# Patient Record
Sex: FEMALE | Race: WHITE | NOT HISPANIC OR LATINO | Employment: OTHER | URBAN - METROPOLITAN AREA
[De-identification: names, ages, dates, MRNs, and addresses within clinical notes are randomized per-mention and may not be internally consistent; named-entity substitution may affect disease eponyms.]

---

## 2017-10-18 ENCOUNTER — TRANSFERRED RECORDS (OUTPATIENT)
Dept: HEALTH INFORMATION MANAGEMENT | Facility: CLINIC | Age: 62
End: 2017-10-18

## 2017-10-20 ENCOUNTER — TRANSFERRED RECORDS (OUTPATIENT)
Dept: HEALTH INFORMATION MANAGEMENT | Facility: CLINIC | Age: 62
End: 2017-10-20

## 2017-10-26 ENCOUNTER — TRANSFERRED RECORDS (OUTPATIENT)
Dept: HEALTH INFORMATION MANAGEMENT | Facility: CLINIC | Age: 62
End: 2017-10-26

## 2018-04-03 ENCOUNTER — TRANSFERRED RECORDS (OUTPATIENT)
Dept: HEALTH INFORMATION MANAGEMENT | Facility: CLINIC | Age: 63
End: 2018-04-03

## 2018-05-29 DIAGNOSIS — J84.9 ILD (INTERSTITIAL LUNG DISEASE) (H): Primary | ICD-10-CM

## 2018-07-08 PROCEDURE — 00000346 ZZHCL STATISTIC REVIEW OUTSIDE SLIDES TC 88321: Performed by: INTERNAL MEDICINE

## 2018-07-09 ASSESSMENT — ENCOUNTER SYMPTOMS
DYSPNEA ON EXERTION: 0
SHORTNESS OF BREATH: 0
COUGH DISTURBING SLEEP: 0
SNORES LOUDLY: 0
SPUTUM PRODUCTION: 0
POSTURAL DYSPNEA: 0
WHEEZING: 0
COUGH: 1
HEMOPTYSIS: 0

## 2018-07-10 LAB — COPATH REPORT: NORMAL

## 2018-07-14 ENCOUNTER — HEALTH MAINTENANCE LETTER (OUTPATIENT)
Age: 63
End: 2018-07-14

## 2018-07-18 ENCOUNTER — OFFICE VISIT (OUTPATIENT)
Dept: PULMONOLOGY | Facility: CLINIC | Age: 63
End: 2018-07-18
Attending: INTERNAL MEDICINE
Payer: COMMERCIAL

## 2018-07-18 ENCOUNTER — RADIANT APPOINTMENT (OUTPATIENT)
Dept: CT IMAGING | Facility: CLINIC | Age: 63
End: 2018-07-18
Attending: INTERNAL MEDICINE
Payer: COMMERCIAL

## 2018-07-18 VITALS
WEIGHT: 190 LBS | HEART RATE: 84 BPM | HEIGHT: 69 IN | DIASTOLIC BLOOD PRESSURE: 91 MMHG | OXYGEN SATURATION: 97 % | RESPIRATION RATE: 17 BRPM | BODY MASS INDEX: 28.14 KG/M2 | SYSTOLIC BLOOD PRESSURE: 135 MMHG

## 2018-07-18 DIAGNOSIS — J84.9 ILD (INTERSTITIAL LUNG DISEASE) (H): ICD-10-CM

## 2018-07-18 DIAGNOSIS — J84.9 ILD (INTERSTITIAL LUNG DISEASE) (H): Primary | ICD-10-CM

## 2018-07-18 LAB
6 MIN WALK (FT): 1520 FT
6 MIN WALK (M): 463 M
ALBUMIN SERPL-MCNC: 4 G/DL (ref 3.4–5)
ALP SERPL-CCNC: 91 U/L (ref 40–150)
ALT SERPL W P-5'-P-CCNC: 42 U/L (ref 0–50)
ANION GAP SERPL CALCULATED.3IONS-SCNC: 7 MMOL/L (ref 3–14)
AST SERPL W P-5'-P-CCNC: 23 U/L (ref 0–45)
BASOPHILS # BLD AUTO: 0.1 10E9/L (ref 0–0.2)
BASOPHILS NFR BLD AUTO: 1 %
BILIRUB DIRECT SERPL-MCNC: 0.1 MG/DL (ref 0–0.2)
BILIRUB SERPL-MCNC: 0.5 MG/DL (ref 0.2–1.3)
BUN SERPL-MCNC: 13 MG/DL (ref 7–30)
CALCIUM SERPL-MCNC: 9.3 MG/DL (ref 8.5–10.1)
CHLORIDE SERPL-SCNC: 104 MMOL/L (ref 94–109)
CK SERPL-CCNC: 47 U/L (ref 30–225)
CO2 SERPL-SCNC: 26 MMOL/L (ref 20–32)
CREAT SERPL-MCNC: 0.71 MG/DL (ref 0.52–1.04)
CRP SERPL-MCNC: 15.8 MG/L (ref 0–8)
DIFFERENTIAL METHOD BLD: ABNORMAL
EOSINOPHIL # BLD AUTO: 0.2 10E9/L (ref 0–0.7)
EOSINOPHIL NFR BLD AUTO: 3.5 %
ERYTHROCYTE [DISTWIDTH] IN BLOOD BY AUTOMATED COUNT: 12.5 % (ref 10–15)
ERYTHROCYTE [SEDIMENTATION RATE] IN BLOOD BY WESTERGREN METHOD: 9 MM/H (ref 0–30)
GFR SERPL CREATININE-BSD FRML MDRD: 84 ML/MIN/1.7M2
GLUCOSE SERPL-MCNC: 90 MG/DL (ref 70–99)
HCT VFR BLD AUTO: 47.9 % (ref 35–47)
HGB BLD-MCNC: 15.3 G/DL (ref 11.7–15.7)
IMM GRANULOCYTES # BLD: 0 10E9/L (ref 0–0.4)
IMM GRANULOCYTES NFR BLD: 0.6 %
LYMPHOCYTES # BLD AUTO: 1.7 10E9/L (ref 0.8–5.3)
LYMPHOCYTES NFR BLD AUTO: 24.7 %
MCH RBC QN AUTO: 29.8 PG (ref 26.5–33)
MCHC RBC AUTO-ENTMCNC: 31.9 G/DL (ref 31.5–36.5)
MCV RBC AUTO: 93 FL (ref 78–100)
MONOCYTES # BLD AUTO: 0.5 10E9/L (ref 0–1.3)
MONOCYTES NFR BLD AUTO: 7.3 %
NEUTROPHILS # BLD AUTO: 4.3 10E9/L (ref 1.6–8.3)
NEUTROPHILS NFR BLD AUTO: 62.9 %
NRBC # BLD AUTO: 0 10*3/UL
NRBC BLD AUTO-RTO: 0 /100
PLATELET # BLD AUTO: 255 10E9/L (ref 150–450)
POTASSIUM SERPL-SCNC: 4.3 MMOL/L (ref 3.4–5.3)
PROT SERPL-MCNC: 8.2 G/DL (ref 6.8–8.8)
RBC # BLD AUTO: 5.13 10E12/L (ref 3.8–5.2)
SODIUM SERPL-SCNC: 138 MMOL/L (ref 133–144)
WBC # BLD AUTO: 6.8 10E9/L (ref 4–11)

## 2018-07-18 PROCEDURE — 86255 FLUORESCENT ANTIBODY SCREEN: CPT | Performed by: INTERNAL MEDICINE

## 2018-07-18 PROCEDURE — 82085 ASSAY OF ALDOLASE: CPT | Performed by: INTERNAL MEDICINE

## 2018-07-18 PROCEDURE — 86235 NUCLEAR ANTIGEN ANTIBODY: CPT | Performed by: INTERNAL MEDICINE

## 2018-07-18 PROCEDURE — 86606 ASPERGILLUS ANTIBODY: CPT | Performed by: INTERNAL MEDICINE

## 2018-07-18 PROCEDURE — 36415 COLL VENOUS BLD VENIPUNCTURE: CPT | Performed by: INTERNAL MEDICINE

## 2018-07-18 PROCEDURE — G0463 HOSPITAL OUTPT CLINIC VISIT: HCPCS | Mod: ZF

## 2018-07-18 PROCEDURE — 82784 ASSAY IGA/IGD/IGG/IGM EACH: CPT | Performed by: INTERNAL MEDICINE

## 2018-07-18 PROCEDURE — 80048 BASIC METABOLIC PNL TOTAL CA: CPT | Performed by: INTERNAL MEDICINE

## 2018-07-18 PROCEDURE — 86038 ANTINUCLEAR ANTIBODIES: CPT | Performed by: INTERNAL MEDICINE

## 2018-07-18 PROCEDURE — 82787 IGG 1 2 3 OR 4 EACH: CPT | Performed by: INTERNAL MEDICINE

## 2018-07-18 PROCEDURE — 86331 IMMUNODIFFUSION OUCHTERLONY: CPT | Performed by: INTERNAL MEDICINE

## 2018-07-18 PROCEDURE — 86140 C-REACTIVE PROTEIN: CPT | Performed by: INTERNAL MEDICINE

## 2018-07-18 PROCEDURE — 80076 HEPATIC FUNCTION PANEL: CPT | Performed by: INTERNAL MEDICINE

## 2018-07-18 PROCEDURE — 86200 CCP ANTIBODY: CPT | Performed by: INTERNAL MEDICINE

## 2018-07-18 PROCEDURE — 82550 ASSAY OF CK (CPK): CPT | Performed by: INTERNAL MEDICINE

## 2018-07-18 PROCEDURE — 86431 RHEUMATOID FACTOR QUANT: CPT | Performed by: INTERNAL MEDICINE

## 2018-07-18 PROCEDURE — 85025 COMPLETE CBC W/AUTO DIFF WBC: CPT | Performed by: INTERNAL MEDICINE

## 2018-07-18 PROCEDURE — 85652 RBC SED RATE AUTOMATED: CPT | Performed by: INTERNAL MEDICINE

## 2018-07-18 RX ORDER — ESCITALOPRAM OXALATE 10 MG/1
10 TABLET ORAL
COMMUNITY
Start: 2018-04-03

## 2018-07-18 ASSESSMENT — PAIN SCALES - GENERAL: PAINLEVEL: NO PAIN (0)

## 2018-07-18 NOTE — MR AVS SNAPSHOT
After Visit Summary   7/18/2018    Windy Antonio    MRN: [id number]           Patient Information     Date Of Birth          1955        Visit Information        Provider Department      7/18/2018 9:30 AM Aaron Snow MD [facility]        Today's Diagnoses     ILD (interstitial lung disease) (H)    -  1       Follow-ups after your visit        Follow-up notes from your care team     Return in about 1 month (around 8/18/2018).      Your next 10 appointments already scheduled     Jul 18, 2018 10:45 AM CDT   Lab with  LAB   [facility] ([facility])    [address]  1st Floor  [place] [id number] [phone]            Aug 29, 2018  8:30 AM CDT   (Arrive by 8:15 AM)   Return Interstitial Lung with Aaron Snow MD   [facility] ([facility])    [address]  [address]  [place] [address] [phone]              Future tests that were ordered for you today     Open Future Orders        Priority Expected Expires Ordered    Rheumatoid factor Routine  10/16/2018 7/18/2018    SSA Ro JASPER Antibody IgG Routine  10/16/2018 7/18/2018    SSB La JASPER Antibody IgG Routine  10/16/2018 7/18/2018    Aldolase Routine  10/16/2018 7/18/2018    Hypersensitivity pneumonitis Routine  10/16/2018 7/18/2018    Hypersens Pneumonitis - Farmer's Lung II Routine  10/16/2018 7/18/2018    ANCA IgG by IFA with Reflex to Titer Routine  10/16/2018 7/18/2018    IgG Subclasses Routine  10/16/2018 7/18/2018    Cyclic Citrullinated Peptide Antibody IgG Routine  10/16/2018 7/18/2018    CBC with platelets differential Routine  8/17/2018 7/18/2018    Basic metabolic panel Routine  8/17/2018 7/18/2018    Hepatic panel Routine  8/17/2018 7/18/2018    Anti Nuclear Conchita IgG by IFA with Reflex Routine  10/16/2018 7/18/2018    Erythrocyte sedimentation rate auto Routine  10/16/2018  "7/18/2018    CRP inflammation Routine  10/16/2018 7/18/2018    CK total Routine  10/16/2018 7/18/2018    Marika 1 Antibody IgG Routine  10/16/2018 7/18/2018    Scleroderma Antibody Scl70 JASPER IgG Routine  10/16/2018 7/18/2018            Who to contact     If you have questions or need follow up information about today's clinic visit or your schedule please contact [facility] directly at [phone].  Normal or non-critical lab and imaging results will be communicated to you by [employer], letter or phone within 4 business days after the clinic has received the results. If you do not hear from us within 7 days, please contact the clinic through [employer] or phone. If you have a critical or abnormal lab result, we will notify you by phone as soon as possible.  Submit refill requests through 360T or call your pharmacy and they will forward the refill request to us. Please allow 3 business days for your refill to be completed.          Additional Information About Your Visit        360T Information     360T gives you secure access to your electronic health record. If you see a primary care provider, you can also send messages to your care team and make appointments. If you have questions, please call your primary care clinic.  If you do not have a primary care provider, please call [phone] and they will assist you.        Care EveryWhere ID     This is your Care EveryWhere ID. This could be used by other organizations to access your [place] medical records  [id number]        Your Vitals Were     Pulse Respirations Height Pulse Oximetry BMI (Body Mass Index)       84 17 1.74 m (5' 8.5\") 97% 28.47 kg/m2        Blood Pressure from Last 3 Encounters:   07/18/18 (!) 135/91    Weight from Last 3 Encounters:   07/18/18 86.2 kg (190 lb)               Primary Care Provider Office Phone # Fax #    Argentina Valdovinos [phone]       [address]  [place] [address]        Equal Access to Services     [facility] : Hadii aad ku hadevayonathan Vinson, wajimmietariq warren, momofrances moody. So [facility] [phone].    ATENCIÓN: Si habla español, tiene a houser disposición servicios gratuitos de asistencia lingüística. Llame al [phone].    We comply with applicable federal civil rights laws and [place] laws. We do not discriminate on the basis of race, color, national origin, age, disability, sex, sexual orientation, or gender identity.            Thank you!     Thank you for choosing [facility]  for your care. Our goal is always to provide you with excellent care. Hearing back from our patients is one way we can continue to improve our services. Please take a few minutes to complete the written survey that you may receive in the mail after your visit with us. Thank you!             Your Updated Medication List - Protect others around you: Learn how to safely use, store and throw away your medicines at www.disposemymeds.org.          This list is accurate as of 7/18/18 10:17 AM.  Always use your most recent med list.                   Brand Name Dispense Instructions for use Diagnosis    escitalopram 10 MG tablet    LEXAPRO     Take 10 mg by mouth    ILD (interstitial lung disease) (H)

## 2018-07-18 NOTE — PROGRESS NOTES
HISTORY OF PRESENT ILLNESS:  Ms. Antonio is a 63-year-old female with interstitial lung disease that is coming for a second opinion.  Her history of present illness she describes a history of having a chronic cough.  She has had a deviated septum and sinus congestion related to that and sort of chronic cough for many, many years.  In 2009, she was diagnosed with breast cancer.  In 2010 she underwent a lumpectomy and underwent chemotherapy with doxorubicin and Cytoxan for 4 months after which she received radiation therapy to the left breast for 6 weeks.  She denies respiratory difficulties in around the time of treatment with her breast cancer.  This past fall she developed a chest cold with increased cough that was treated with an antibiotic and was slow to resolve, which prompted a chest x-ray which showed some abnormalities.  After the course of antibiotics a repeat chest x-ray did not show resolution, so she end up having a chest CT scan which showed evidence of interstitial lung disease.  She then underwent a VATS biopsy in the fall of 2017, which was read as being consistent with UIP and she was given a diagnosis of IPF in 10/2017.  She was initiated on OFEV therapy, received a total of 3 weeks with it, and had significant side effects including rash, diarrhea, nausea, vomiting and increased liver function tests.  She was pulled off the OFEV at that time and so far since then has been on no other treatment.  In terms of her breathing she is an active person, she does exercises and activities of daily living, and she does not feel restricted by her breathing and does not notice dyspnea on exertion.  In terms of her past pulmonary history she is a nonsmoker and she has no history of past lung problems other than the chronic cough that she is aware of.      PAST MEDICAL HISTORY:   1.  Interstitial lung disease diagnosed in 2017 by VATS biopsy.   2.  Breast cancer diagnosed in 12/2009, underwent treatment with  chemotherapy and radiation therapy in .   3.  Status post oophorectomy in  for nonmalignant problem.   4.  History of parathyroid nodule, status post removal.   5.  Status post right wrist fracture from a fall.   6.  History of anxiety.      MEDICATIONS:  The only medication the patient is on currently is Lexapro.      FAMILY HISTORY:  Mother  of lung cancer, was a heavy smoker.  Father  of congestive heart failure.  Also had a brother  from lung cancer who also was a heavy smoker.  There is not a family history of connective tissue diseases that the patient is aware of and there is not a family history of pulmonary fibrosis that the patient is aware of.      SOCIAL HISTORY:  Again, she is a nonsmoker.  She owns adult  centers.  She denies exposure to asbestos.  No hot tub.  They do have a cabin.  There is no significant exposure to molds that the patient is aware of.  There are no particular hobbies that she has that would result in any exposures to do other things.      REVIEW OF SYSTEMS:     HEENT:  Again the deviated septum with nasal congestion, chronic cough.   CARDIAC:  She denies palpitations, any arrhythmias, no syncope, no chest pain.   GASTROINTESTINAL:  She denies GI problems.  She specifically denies reflux or heartburn.   GENITOURINARY:  Negative.   MUSCULOSKELETAL:  She has a history of the right wrist fracture and discomfort in that, but otherwise no arthritic problems that she is aware of.     The rest of her comprehensive review of systems is negative.      PHYSICAL EXAMINATION:   VITAL SIGNS:  Her blood pressure is 135/91, pulse is 84, respiratory rate 17.  O2 sats are 97% on room air.  Weight is 190 pounds.   EYES:  She is anicteric.   ENT:  Mouth and throat are without lesions.   NECK:  Supple.  No masses are appreciated, no thyromegaly or adenopathy is appreciated.   CHEST:  She has subtle crackles in the bases of the lungs, but otherwise sounds largely clear.    HEART:  Regular rate and rhythm, normal S1 and S2, no murmurs appreciated.   ABDOMEN:  Nontender, no hepatosplenomegaly.   EXTREMITIES:  Without clubbing, cyanosis or edema.  Again, I did not appreciate clubbing on examination.   MUSCULOSKELETAL:  Other than the right wrist deformity, no significant arthritic changes are noted.   SKIN:  No rashes noted.      PULMONARY FUNCTION TESTS:  The patient had PFTs done today.  She had a 6-minute walk where her walk distance was within normal limits.  Her O2 sats started out at 97%, the lowest it got was 91% during the walk.  Her saturations decreased but no desaturation and the walk test was done without oxygen.  Spirometry FEV1 is 2.41 or 84% of predicted, FVC was 2.9 or 79% of predicted, and total lung capacity was normal at 4.74 or 81% of predicted.  Diffusion capacity was mildly reduced at 64% of predicted.  There may be mild restriction and a mild diffusion defect.      IMAGING:  Chest CT scan was reviewed by me and showed peripheral interstitial opacities.  There was not a real great apical basilar gradient and I did not see honeycombing change.  The findings would be suggestive of IPF, but not diagnostic of IPF.  The lung biopsy I not had an opportunity to review with our lung pathologist as of yet.      ASSESSMENT AND PLAN:  In summary, Windy Antonio is a 63-year-old female with interstitial lung disease.  She currently is not symptomatic from her ILD and the PFTs only show mild restriction and mild diffusion defect. The CT scan is in a pattern that is consistent with IPF, but not diagnostic of IPF.  The lung biopsy was read by at the [facility] and was felt to have features consistent with UIP, so IPF is certainly within the differential diagnosis.  However, the patient did receive Cytoxan for treatment of her breast cancer. Cytoxan can cause ILD and changes consistent with what is seen on CT scan, so the remains a possibility that her ILD could be  drug related.  I will send off an ILD panel on her, I will review her CT scan with our radiologist and I will review her lung biopsy with our pathologist.  I will see the patient back in 1 month period of time.  It is possible that I may not be able to make a definitive diagnosis of IPF.  We do not have serial PFTs on her other than dating back to September and there has not been any significant decrease in her PFTs from today compared to 09/2017.  We may end up just having to follow her to see whether or not the disease progresses. If the ILD is related to Cytoxan exposure it may not necessarily progress, whereas IPF will progress.          Aaron Snow  Pulmonary/Critical Care  July 18, 2018 1:24 PM    Addendum: case discussed at ILD conference. Chest CT is possible to probable IPF. Biopsy is not definitive for IPF - little or no subpleural fibrosis. Unclear that this is IPF. Could be related to Cytoxan in the past. Follow PFTs. If worsens could consider Esbriet - did not tolerated OFEV.    Aaron Snow MD          Answers for HPI/ROS submitted by the patient on 7/9/2018   General Symptoms: No  Skin Symptoms: No  HENT Symptoms: No  EYE SYMPTOMS: No  HEART SYMPTOMS: No  LUNG SYMPTOMS: Yes  INTESTINAL SYMPTOMS: No  URINARY SYMPTOMS: No  GYNECOLOGIC SYMPTOMS: No  BREAST SYMPTOMS: No  SKELETAL SYMPTOMS: No  BLOOD SYMPTOMS: No  NERVOUS SYSTEM SYMPTOMS: No  MENTAL HEALTH SYMPTOMS: No  Cough: Yes  Sputum or phlegm: No  Coughing up blood: No  Difficulty breating or shortness of breath: No  Snoring: No  Wheezing: No  Difficulty breathing on exertion: No  Nighttime Cough: No  Difficulty breathing when lying flat: No

## 2018-07-18 NOTE — LETTER
7/18/2018       RE: Windy Antonio  [address] [place] [id number]     Dear Colleague,    Thank you for referring your patient, Windy Antonio, to the [facility] at [facility]. Please see a copy of my visit note below.    HISTORY OF PRESENT ILLNESS:  Ms. Antonio is a 63-year-old female with interstitial lung disease that is coming for a second opinion.  Her history of present illness she describes a history of having a chronic cough.  She has had a deviated septum and sinus congestion related to that and sort of chronic cough for many, many years.  In 2009, she was diagnosed with breast cancer.  In 2010 she underwent a lumpectomy and underwent chemotherapy with doxorubicin and Cytoxan for 4 months after which she received radiation therapy to the left breast for 6 weeks.  She denies respiratory difficulties in around the time of treatment with her breast cancer.  This past fall she developed a chest cold with increased cough that was treated with an antibiotic and was slow to resolve, which prompted a chest x-ray which showed some abnormalities.  After the course of antibiotics a repeat chest x-ray did not show resolution, so she end up having a chest CT scan which showed evidence of interstitial lung disease.  She then underwent a VATS biopsy in the fall of 2017, which was read as being consistent with UIP and she was given a diagnosis of IPF in 10/2017.  She was initiated on OFEV therapy, received a total of 3 weeks with it, and had significant side effects including rash, diarrhea, nausea, vomiting and increased liver function tests.  She was pulled off the OFEV at that time and so far since then has been on no other treatment.  In terms of her breathing she is an active person, she does exercises and activities of daily living, and she does not feel restricted by her breathing and does not notice dyspnea on exertion.  In terms of  her past pulmonary history she is a nonsmoker and she has no history of past lung problems other than the chronic cough that she is aware of.      PAST MEDICAL HISTORY:   1.  Interstitial lung disease diagnosed in 2017 by VATS biopsy.   2.  Breast cancer diagnosed in 2009, underwent treatment with chemotherapy and radiation therapy in .   3.  Status post oophorectomy in  for nonmalignant problem.   4.  History of parathyroid nodule, status post removal.   5.  Status post right wrist fracture from a fall.   6.  History of anxiety.      MEDICATIONS:  The only medication the patient is on currently is Lexapro.      FAMILY HISTORY:  Mother  of lung cancer, was a heavy smoker.  Father  of congestive heart failure.  Also had a brother  from lung cancer who also was a heavy smoker.  There is not a family history of connective tissue diseases that the patient is aware of and there is not a family history of pulmonary fibrosis that the patient is aware of.      SOCIAL HISTORY:  Again, she is a nonsmoker.  She owns adult  centers.  She denies exposure to asbestos.  No hot tub.  They do have a cabin.  There is no significant exposure to molds that the patient is aware of.  There are no particular hobbies that she has that would result in any exposures to do other things.      REVIEW OF SYSTEMS:     HEENT:  Again the deviated septum with nasal congestion, chronic cough.   CARDIAC:  She denies palpitations, any arrhythmias, no syncope, no chest pain.   GASTROINTESTINAL:  She denies GI problems.  She specifically denies reflux or heartburn.   GENITOURINARY:  Negative.   MUSCULOSKELETAL:  She has a history of the right wrist fracture and discomfort in that, but otherwise no arthritic problems that she is aware of.     The rest of her comprehensive review of systems is negative.      PHYSICAL EXAMINATION:   VITAL SIGNS:  Her blood pressure is 135/91, pulse is 84, respiratory rate 17.  O2 sats are 97%  on room air.  Weight is 190 pounds.   EYES:  She is anicteric.   ENT:  Mouth and throat are without lesions.   NECK:  Supple.  No masses are appreciated, no thyromegaly or adenopathy is appreciated.   CHEST:  She has subtle crackles in the bases of the lungs, but otherwise sounds largely clear.   HEART:  Regular rate and rhythm, normal S1 and S2, no murmurs appreciated.   ABDOMEN:  Nontender, no hepatosplenomegaly.   EXTREMITIES:  Without clubbing, cyanosis or edema.  Again, I did not appreciate clubbing on examination.   MUSCULOSKELETAL:  Other than the right wrist deformity, no significant arthritic changes are noted.   SKIN:  No rashes noted.      PULMONARY FUNCTION TESTS:  The patient had PFTs done today.  She had a 6-minute walk where her walk distance was within normal limits.  Her O2 sats started out at 97%, the lowest it got was 91% during the walk.  Her saturations decreased but no desaturation and the walk test was done without oxygen.  Spirometry FEV1 is 2.41 or 84% of predicted, FVC was 2.9 or 79% of predicted, and total lung capacity was normal at 4.74 or 81% of predicted.  Diffusion capacity was mildly reduced at 64% of predicted.  There may be mild restriction and a mild diffusion defect.      IMAGING:  Chest CT scan was reviewed by me and showed peripheral interstitial opacities.  There was not a real great apical basilar gradient and I did not see honeycombing change.  The findings would be suggestive of IPF, but not diagnostic of IPF.  The lung biopsy I not had an opportunity to review with our lung pathologist as of yet.      ASSESSMENT AND PLAN:  In summary, Windy Antonio is a 63-year-old female with interstitial lung disease.  She currently is not symptomatic from her ILD and the PFTs only show mild restriction and mild diffusion defect. The CT scan is in a pattern that is consistent with IPF, but not diagnostic of IPF.  The lung biopsy was read by at the [facility] and was felt to  have features consistent with UIP, so IPF is certainly within the differential diagnosis.  However, the patient did receive Cytoxan for treatment of her breast cancer. Cytoxan can cause ILD and changes consistent with what is seen on CT scan, so the remains a possibility that her ILD could be drug related.  I will send off an ILD panel on her, I will review her CT scan with our radiologist and I will review her lung biopsy with our pathologist.  I will see the patient back in 1 month period of time.  It is possible that I may not be able to make a definitive diagnosis of IPF.  We do not have serial PFTs on her other than dating back to September and there has not been any significant decrease in her PFTs from today compared to 09/2017.  We may end up just having to follow her to see whether or not the disease progresses. If the ILD is related to Cytoxan exposure it may not necessarily progress, whereas IPF will progress.          Aaron Snow  Pulmonary/Critical Care  July 18, 2018 1:24 PM

## 2018-07-19 LAB
ALDOLASE SERPL-CCNC: 5.3 U/L (ref 1.5–8.1)
ANA SER QL IF: NEGATIVE
ANCA AB PATTERN SER IF-IMP: NORMAL
C-ANCA TITR SER IF: NORMAL {TITER}
CCP AB SER IA-ACNC: <1 U/ML
ENA JO1 IGG SER-ACNC: <0.2 AI (ref 0–0.9)
ENA SCL70 IGG SER IA-ACNC: <0.2 AI (ref 0–0.9)
ENA SS-A IGG SER IA-ACNC: <0.2 AI (ref 0–0.9)
ENA SS-B IGG SER IA-ACNC: <0.2 AI (ref 0–0.9)
RHEUMATOID FACT SER NEPH-ACNC: <20 IU/ML (ref 0–20)

## 2018-07-20 LAB
IGG SERPL-MCNC: 883 MG/DL (ref 695–1620)
IGG1 SER-MCNC: 457 MG/DL (ref 300–856)
IGG2 SER-MCNC: 266 MG/DL (ref 158–761)
IGG3 SER-MCNC: 36 MG/DL (ref 24–192)
IGG4 SER-MCNC: 11 MG/DL (ref 11–86)

## 2018-07-23 LAB
A FLAVUS AB SER QL ID: NORMAL
A FUMIGATUS1 AB SER QL ID: NORMAL
A FUMIGATUS2 AB SER QL: NORMAL
A FUMIGATUS3 AB SER QL ID: NORMAL
A FUMIGATUS6 AB SER QL ID: NORMAL
A PULLULANS AB SER QL ID: NORMAL
LACEYELLA SACCHARI AB SER QL: NORMAL
PIGEON DROP IGE QN: NORMAL
S RECTIVIRGULA AB SER QL ID: NORMAL
S VIRIDIS AB SER QL: NORMAL
T CANDIDUS AB SER QL: NORMAL
T VULGARIS AB SER QL ID: NORMAL

## 2018-08-28 ENCOUNTER — PATIENT OUTREACH (OUTPATIENT)
Dept: CARE COORDINATION | Facility: CLINIC | Age: 63
End: 2018-08-28

## 2018-08-29 ENCOUNTER — OFFICE VISIT (OUTPATIENT)
Dept: PULMONOLOGY | Facility: CLINIC | Age: 63
End: 2018-08-29
Attending: INTERNAL MEDICINE
Payer: COMMERCIAL

## 2018-08-29 VITALS
SYSTOLIC BLOOD PRESSURE: 120 MMHG | DIASTOLIC BLOOD PRESSURE: 84 MMHG | HEART RATE: 85 BPM | WEIGHT: 200.4 LBS | BODY MASS INDEX: 30.03 KG/M2 | RESPIRATION RATE: 16 BRPM | OXYGEN SATURATION: 94 %

## 2018-08-29 DIAGNOSIS — J84.9 ILD (INTERSTITIAL LUNG DISEASE) (H): Primary | ICD-10-CM

## 2018-08-29 LAB
DLCOUNC-%PRED-PRE: 64 %
DLCOUNC-PRE: 15.04 ML/MIN/MMHG
DLCOUNC-PRED: 23.38 ML/MIN/MMHG
ERV-%PRED-PRE: 80 %
ERV-PRE: 0.67 L
ERV-PRED: 0.83 L
EXPTIME-PRE: 8.4 SEC
FEF2575-%PRED-PRE: 109 %
FEF2575-PRE: 2.64 L/SEC
FEF2575-PRED: 2.41 L/SEC
FEFMAX-%PRED-PRE: 108 %
FEFMAX-PRE: 7.55 L/SEC
FEFMAX-PRED: 6.95 L/SEC
FEV1-%PRED-PRE: 84 %
FEV1-PRE: 2.41 L
FEV1FEV6-PRE: 83 %
FEV1FEV6-PRED: 80 %
FEV1FVC-PRE: 83 %
FEV1FVC-PRED: 78 %
FEV1SVC-PRE: 81 %
FEV1SVC-PRED: 74 %
FIFMAX-PRE: 4.07 L/SEC
FRCPLETH-%PRED-PRE: 81 %
FRCPLETH-PRE: 2.43 L
FRCPLETH-PRED: 2.99 L
FVC-%PRED-PRE: 79 %
FVC-PRE: 2.9 L
FVC-PRED: 3.66 L
IC-%PRED-PRE: 76 %
IC-PRE: 2.31 L
IC-PRED: 3.02 L
RVPLETH-%PRED-PRE: 80 %
RVPLETH-PRE: 1.75 L
RVPLETH-PRED: 2.17 L
TLCPLETH-%PRED-PRE: 81 %
TLCPLETH-PRE: 4.74 L
TLCPLETH-PRED: 5.78 L
VA-%PRED-PRE: 64 %
VA-PRE: 3.78 L
VC-%PRED-PRE: 77 %
VC-PRE: 2.98 L
VC-PRED: 3.85 L

## 2018-08-29 PROCEDURE — G0463 HOSPITAL OUTPT CLINIC VISIT: HCPCS | Mod: ZF

## 2018-08-29 ASSESSMENT — PAIN SCALES - GENERAL: PAINLEVEL: NO PAIN (0)

## 2018-08-29 NOTE — NURSING NOTE
Chief Complaint   Patient presents with     RECHECK     one month follow up for IPF      Marti Mondragon CMA

## 2018-08-29 NOTE — LETTER
8/29/2018       RE: Windy Antonio  [address] [place] [id number]     Dear Colleague,    Thank you for referring your patient, Windy Antonio, to the [facility] at [facility]. Please see a copy of my visit note below.    This was a 30-minute followup after an initial visit.  We discussed the results of tests, diagnosis and plan going forward.  During the visit, we discussed the results of the chest CT scan indicating that it was possible, probable IPF.  However, the lung biopsy was not definitive for IPF as there was little or no subpleural fibrosis.  Currently, it is unclear whether she has IPF.  This could be related to Cytoxan in the past. She received cytoxan in 2010 as chemotherapy for breast cancer.  We discussed that for now I would recommend following the pulmonary function tests and if the PFTs worsen, we could consider antifibrotic therapy such as Esbriet.  The patient did not tolerate OFEV when she was on it briefly.  My plan going forward will be to repeat pulmonary function tests in 3 months.  If the PFTs are stable at that point in time, I probably can lengthen the visits out to every 6 months.  It was recommended to her that this upcoming fall that she receive the influenza shot and she has been instructed to call us if she would develop any chest colds. Again 30 minutes were spent with the patient reviewing her case, diagnosis and plan going forward.     Aaron Snow  Pulmonary/Critical Care  August 29, 2018 9:16 AM

## 2018-08-29 NOTE — MR AVS SNAPSHOT
After Visit Summary   8/29/2018    Windy Antonio    MRN: [id number]           Patient Information     Date Of Birth          1955        Visit Information        Provider Department      8/29/2018 8:30 AM Aaron Snow MD [facility]        Today's Diagnoses     ILD (interstitial lung disease) (H)    -  1       Follow-ups after your visit        Follow-up notes from your care team     Return in about 3 months (around 11/29/2018).      Your next 10 appointments already scheduled     Dec 05, 2018  9:30 AM CST   PFT VISIT with  PFL B   [facility] Pulmonary Function Testing ([facility])    [address]  3rd Floor  [facility] [address] [phone]            Dec 05, 2018 10:00 AM CST   (Arrive by 9:45 AM)   Return Interstitial Lung with Aaron Snow MD   [facility] ([facility])    [address]  [address] [place] [address] [phone]              Future tests that were ordered for you today     Open Future Orders        Priority Expected Expires Ordered    Spirometry, Breathing Capacity Routine 11/29/2018 8/29/2019 8/29/2018    HC DIFFUSING CAPACITY Routine 11/29/2018 8/29/2019 8/29/2018            Who to contact     If you have questions or need follow up information about today's clinic visit or your schedule please contact [facility] directly at [phone].  Normal or non-critical lab and imaging results will be communicated to you by MyChart, letter or phone within 4 business days after the clinic has received the results. If you do not hear from us within 7 days, please contact the clinic through MyChart or phone. If you have a critical or abnormal lab result, we will notify you by phone as soon as possible.  Submit refill requests through Magazing[employer] or call your pharmacy and they will forward the refill  request to us. Please allow 3 business days for your refill to be completed.          Additional Information About Your Visit        [employer] Information     [employer] gives you secure access to your electronic health record. If you see a primary care provider, you can also send messages to your care team and make appointments. If you have questions, please call your primary care clinic.  If you do not have a primary care provider, please call [phone] and they will assist you.        Care EveryWhere ID     This is your Care EveryWhere ID. This could be used by other organizations to access your [facility] medical records  [id number]        Your Vitals Were     Pulse Respirations Pulse Oximetry BMI (Body Mass Index)          85 16 94% 30.03 kg/m2         Blood Pressure from Last 3 Encounters:   08/29/18 120/84   07/18/18 (!) 135/91    Weight from Last 3 Encounters:   08/29/18 90.9 kg (200 lb 6.4 oz)   07/18/18 86.2 kg (190 lb)               Primary Care Provider Office Phone # Fax #    [place] ABIGAIL Valdovinos [phone]       [facility] [address] [facility] [address]  [facility] [address]        Equal Access to Services     SAHIL MARADIAGA AH: Hadii aad ku hadasho Soomaali, waaxda luqadaha, qaybta kaalmada adeegyada, frances brooks haykelleyn tabby landin la'aan ah. So [facility] [phone].    ATENCIÓN: Si habla español, tiene a houser disposición servicios gratuitos de asistencia lingüística. [facility] [phone].    We comply with applicable federal civil rights laws and [place] laws. We do not discriminate on the basis of race, color, national origin, age, disability, sex, sexual orientation, or gender identity.            Thank you!     Thank you for choosing [facility]  for your care. Our goal is always to provide you with excellent care. Hearing back from our patients is one way we can continue to improve our services. Please take a few minutes to complete the written survey that you  may receive in the mail after your visit with us. Thank you!             Your Updated Medication List - Protect others around you: Learn how to safely use, store and throw away your medicines at www.disposemymeds.org.          This list is accurate as of 8/29/18  8:38 AM.  Always use your most recent med list.                   Brand Name Dispense Instructions for use Diagnosis    escitalopram 10 MG tablet    LEXAPRO     Take 10 mg by mouth    ILD (interstitial lung disease) (H)       * MUCINEX ALLERGY PO      Take 1,200 mg by mouth        * ALLEGRA ODT PO           * Notice:  This list has 2 medication(s) that are the same as other medications prescribed for you. Read the directions carefully, and ask your doctor or other care provider to review them with you.

## 2018-08-29 NOTE — PROGRESS NOTES
This was a 30-minute followup after an initial visit.  We discussed the results of tests, diagnosis and plan going forward.  During the visit, we discussed the results of the chest CT scan indicating that it was possible, probable IPF.  However, the lung biopsy was not definitive for IPF as there was little or no subpleural fibrosis.  Currently, it is unclear whether she has IPF.  This could be related to Cytoxan in the past. She received cytoxan in 2010 as chemotherapy for breast cancer.  We discussed that for now I would recommend following the pulmonary function tests and if the PFTs worsen, we could consider antifibrotic therapy such as Esbriet.  The patient did not tolerate OFEV when she was on it briefly.  My plan going forward will be to repeat pulmonary function tests in 3 months.  If the PFTs are stable at that point in time, I probably can lengthen the visits out to every 6 months.  It was recommended to her that this upcoming fall that she receive the influenza shot and she has been instructed to call us if she would develop any chest colds. Again 30 minutes were spent with the patient reviewing her case, diagnosis and plan going forward.     Aaron Snow  Pulmonary/Critical Care  August 29, 2018 9:16 AM              Answers for HPI/ROS submitted by the patient on 8/21/2018   General Symptoms: No  Skin Symptoms: No  HENT Symptoms: No  EYE SYMPTOMS: No  HEART SYMPTOMS: No  LUNG SYMPTOMS: No  INTESTINAL SYMPTOMS: No  URINARY SYMPTOMS: No  GYNECOLOGIC SYMPTOMS: No  BREAST SYMPTOMS: No  SKELETAL SYMPTOMS: No  BLOOD SYMPTOMS: No  NERVOUS SYSTEM SYMPTOMS: No  MENTAL HEALTH SYMPTOMS: No

## 2018-10-12 ENCOUNTER — CARE COORDINATION (OUTPATIENT)
Dept: PULMONOLOGY | Facility: CLINIC | Age: 63
End: 2018-10-12

## 2018-10-12 DIAGNOSIS — J84.9 ILD (INTERSTITIAL LUNG DISEASE) (H): Primary | ICD-10-CM

## 2018-10-12 RX ORDER — LEVOFLOXACIN 500 MG/1
500 TABLET, FILM COATED ORAL DAILY
Qty: 10 TABLET | Refills: 0 | Status: SHIPPED | OUTPATIENT
Start: 2018-10-12 | End: 2019-03-02

## 2018-10-12 NOTE — PROGRESS NOTES
Patient called with symptoms of productive cough and chest tightness.  States that she had a head cold and it has now settled in her chest.  She is coughing up a lot of greenish colored sputum.  Discussed with Dr. Snow who prescribed Levaquin.  Patient instructed to call or seek medical attention if symptoms get worse.  Prescription sent to patients desired pharmacy.

## 2018-12-05 ENCOUNTER — OFFICE VISIT (OUTPATIENT)
Dept: PULMONOLOGY | Facility: CLINIC | Age: 63
End: 2018-12-05
Attending: INTERNAL MEDICINE
Payer: COMMERCIAL

## 2018-12-05 VITALS
BODY MASS INDEX: 28.58 KG/M2 | OXYGEN SATURATION: 97 % | HEIGHT: 69 IN | RESPIRATION RATE: 18 BRPM | DIASTOLIC BLOOD PRESSURE: 102 MMHG | WEIGHT: 193 LBS | SYSTOLIC BLOOD PRESSURE: 151 MMHG | HEART RATE: 80 BPM

## 2018-12-05 DIAGNOSIS — J84.9 ILD (INTERSTITIAL LUNG DISEASE) (H): Primary | ICD-10-CM

## 2018-12-05 PROCEDURE — G0463 HOSPITAL OUTPT CLINIC VISIT: HCPCS | Mod: ZF

## 2018-12-05 NOTE — MR AVS SNAPSHOT
After Visit Summary   12/5/2018    Windy Antonio    MRN: [id number]           Patient Information     Date Of Birth          1955        Visit Information        Provider Department      12/5/2018 10:00 AM Aaron Snow MD [facility]        Today's Diagnoses     ILD (interstitial lung disease) (H)    -  1       Follow-ups after your visit        Follow-up notes from your care team     Return in about 3 months (around 3/5/2019).      Your next 10 appointments already scheduled     Mar 13, 2019  9:30 AM CDT   PFT VISIT with  PFL D   [facility] Pulmonary Function Testing ([facility])    [address]  3rd Floor  [facility] [address] [phone]            Mar 13, 2019 10:00 AM CDT   (Arrive by 9:45 AM)   Return Interstitial Lung with Aaron Snow MD   [facility] ([facility] [place])    [address]  [address]  [facility] [address] [phone]              Future tests that were ordered for you today     Open Future Orders        Priority Expected Expires Ordered    Spirometry, Breathing Capacity Routine 3/5/2019 12/5/2019 12/5/2018    HC DIFFUSING CAPACITY Routine 3/5/2019 12/5/2019 12/5/2018            Who to contact     If you have questions or need follow up information about today's clinic visit or your schedule please contact [facility] directly at [phone].  Normal or non-critical lab and imaging results will be communicated to you by MyChart, letter or phone within 4 business days after the clinic has received the results. If you do not hear from us within 7 days, please contact the clinic through MyChart or phone. If you have a critical or abnormal lab result, we will notify you by phone as soon as possible.  Submit refill requests through [employer] or call your pharmacy and they will forward the refill  "request to us. Please allow 3 business days for your refill to be completed.          Additional Information About Your Visit        [employer] Information     [employer] gives you secure access to your electronic health record. If you see a primary care provider, you can also send messages to your care team and make appointments. If you have questions, please call your primary care clinic.  If you do not have a primary care provider, please call [phone] and they will assist you.        Care EveryWhere ID     This is your Care EveryWhere ID. This could be used by other organizations to access your [facility] medical records  [id number]        Your Vitals Were     Pulse Respirations Height Pulse Oximetry BMI (Body Mass Index)       80 18 1.753 m (5' 9\") 97% 28.5 kg/m2        Blood Pressure from Last 3 Encounters:   12/05/18 (!) 151/102   08/29/18 120/84   07/18/18 (!) 135/91    Weight from Last 3 Encounters:   12/05/18 87.5 kg (193 lb)   08/29/18 90.9 kg (200 lb 6.4 oz)   07/18/18 86.2 kg (190 lb)               Primary Care Provider Office Phone # Fax #    [place] ABIGAIL Burnson [phone]       [facility] [place] [address]  [facility] [address]        Equal Access to Services     SAHIL MARADIAGA : Hadii aad ku hadasho Soomaali, waaxda luqadaha, qaybta kaalmada adeegyada, frances brooks hayju bass. So [facility] [phone].    ATENCIÓN: Si habla español, tiene a houser disposición servicios gratuitos de asistencia lingüística. Llame al [phone].    We comply with applicable federal civil rights laws and [place] laws. We do not discriminate on the basis of race, color, national origin, age, disability, sex, sexual orientation, or gender identity.            Thank you!     Thank you for choosing [facility]  for your care. Our goal is always to provide you with excellent care. Hearing back from our patients is one way we can continue to improve our " services. Please take a few minutes to complete the written survey that you may receive in the mail after your visit with us. Thank you!             Your Updated Medication List - Protect others around you: Learn how to safely use, store and throw away your medicines at www.disposemymeds.org.          This list is accurate as of 12/5/18 10:06 AM.  Always use your most recent med list.                   Brand Name Dispense Instructions for use Diagnosis    escitalopram 10 MG tablet    LEXAPRO     Take 10 mg by mouth    ILD (interstitial lung disease) (H)       levofloxacin 500 MG tablet    LEVAQUIN    10 tablet    Take 1 tablet (500 mg) by mouth daily    ILD (interstitial lung disease) (H)       * MUCINEX ALLERGY PO      Take 1,200 mg by mouth        * ALLEGRA ODT PO           * Notice:  This list has 2 medication(s) that are the same as other medications prescribed for you. Read the directions carefully, and ask your doctor or other care provider to review them with you.

## 2018-12-05 NOTE — LETTER
12/5/2018       RE: Windy Antonio  [address] [place], [place] [id number]     Dear Colleague,    Thank you for referring your patient, Windy Antonio, to the [facility] at [facility]. Please see a copy of my visit note below.    The patient is a 63-year-old female with interstitial lung disease.  The patient underwent a lung biopsy which was not definitive for IPF, there is little or no subpleural fibrosis and the CT scan showed findings that were suggestive of IPF.  The patient also has a history of breast cancer.  She received Cytoxan in 2010 and chemotherapy for the Cytoxan so currently is unclear what the cause of her scarring in her lungs are.  Since her last visit with me in August she had a cold that settled in her chest.  She received Levaquin for that.  Did well with that with resolution of her cough and sputum production.  Currently, she returns stating that her breathing is stable.  She has received the flu shot.  She is not on oxygen and she is not on any specific therapy for her breathing.      PHYSICAL EXAMINATION:     VITAL SIGNS:  Her O2 sats are 97% on room air.  Her blood pressure was high today.  This is being managed by her primary care physician.   HEENT:  Unremarkable.  Mouth and throat no thrush.   NECK:  Supple, with no thyromegaly and no adenopathy.   CHEST:  She has faint crackles in the bases of the lungs, otherwise clear, unchanged.   CARDIOVASCULAR:  Regular rate and rhythm, normal S1, S2.   ABDOMEN:  Benign.   EXTREMITIES:  Without clubbing, cyanosis or edema.   JOINTS:  No arthritis is noticeable.      PFTs today are stable.  FEV1 2.6 or 91% predicted.  FVC is 3.08 or 84% of predicted.  Both of these are within normal limits.  Her DLCO is 72% of predicted.  PFTs are stable compared to what they were July at which time her FVC was 2.9, again today it was 3.08.      ASSESSMENT AND PLAN:     1.  In summary then,  Noemí Antonio is a 63-year-old female we have interstitial lung disease.  The etiology of her ILD is unclear. The biopsy was not diagnostic of IPF but the chest CT scan shows features of possible IPF. She remains asymptomatic.  Her PFTs are stable.  O2 sats are stable at 97% on room air.  I will have her follow up in 3 months with repeat pulmonary function tests.  If things remain stable after that, we may be able to lengthen the appointments out to 6 months.     2.  Vaccinations.  She did get the flu shot and she has had pneumonia shot.       Aaron Snow  Pulmonary/Critical Care  December 5, 2018 11:29 AM

## 2018-12-05 NOTE — PROGRESS NOTES
The patient is a 63-year-old female with interstitial lung disease.  The patient underwent a lung biopsy which was not definitive for IPF, there is little or no subpleural fibrosis and the CT scan showed findings that were suggestive of IPF.  The patient also has a history of breast cancer.  She received Cytoxan in 2010 and chemotherapy for the Cytoxan so currently is unclear what the cause of her scarring in her lungs are.  Since her last visit with me in August she had a cold that settled in her chest.  She received Levaquin for that.  Did well with that with resolution of her cough and sputum production.  Currently, she returns stating that her breathing is stable.  She has received the flu shot.  She is not on oxygen and she is not on any specific therapy for her breathing.      PHYSICAL EXAMINATION:     VITAL SIGNS:  Her O2 sats are 97% on room air.  Her blood pressure was high today.  This is being managed by her primary care physician.   HEENT:  Unremarkable.  Mouth and throat no thrush.   NECK:  Supple, with no thyromegaly and no adenopathy.   CHEST:  She has faint crackles in the bases of the lungs, otherwise clear, unchanged.   CARDIOVASCULAR:  Regular rate and rhythm, normal S1, S2.   ABDOMEN:  Benign.   EXTREMITIES:  Without clubbing, cyanosis or edema.   JOINTS:  No arthritis is noticeable.      PFTs today are stable.  FEV1 2.6 or 91% predicted.  FVC is 3.08 or 84% of predicted.  Both of these are within normal limits.  Her DLCO is 72% of predicted.  PFTs are stable compared to what they were July at which time her FVC was 2.9, again today it was 3.08.      ASSESSMENT AND PLAN:     1.  In summary then, Noemí Antonio is a 63-year-old female we have interstitial lung disease.  The etiology of her ILD is unclear. The biopsy was not diagnostic of IPF but the chest CT scan shows features of possible IPF. She remains asymptomatic.  Her PFTs are stable.  O2 sats are stable at 97% on room air.  I will have her  follow up in 3 months with repeat pulmonary function tests.  If things remain stable after that, we may be able to lengthen the appointments out to 6 months.     2.  Vaccinations.  She did get the flu shot and she has had pneumonia shot.       Aaron Snow  Pulmonary/Critical Care  December 5, 2018 11:29 AM

## 2018-12-05 NOTE — NURSING NOTE
Chief Complaint   Patient presents with     Interstitial Lung Disease (ILD)     3 month follow up on Windy Catherine CMA at 9:28 AM on 12/5/2018

## 2018-12-06 LAB
DLCOUNC-%PRED-PRE: 72 %
DLCOUNC-PRE: 16.97 ML/MIN/MMHG
DLCOUNC-PRED: 23.34 ML/MIN/MMHG
ERV-%PRED-PRE: 116 %
ERV-PRE: 0.95 L
ERV-PRED: 0.82 L
EXPTIME-PRE: 6.36 SEC
FEF2575-%PRED-PRE: 118 %
FEF2575-PRE: 2.82 L/SEC
FEF2575-PRED: 2.39 L/SEC
FEFMAX-%PRED-PRE: 115 %
FEFMAX-PRE: 8.03 L/SEC
FEFMAX-PRED: 6.93 L/SEC
FEV1-%PRED-PRE: 91 %
FEV1-PRE: 2.6 L
FEV1FEV6-PRE: 85 %
FEV1FEV6-PRED: 80 %
FEV1FVC-PRE: 85 %
FEV1FVC-PRED: 77 %
FEV1SVC-PRE: 85 %
FEV1SVC-PRED: 74 %
FIFMAX-PRE: 5.84 L/SEC
FVC-%PRED-PRE: 84 %
FVC-PRE: 3.08 L
FVC-PRED: 3.65 L
IC-%PRED-PRE: 69 %
IC-PRE: 2.11 L
IC-PRED: 3.03 L
VA-%PRED-PRE: 68 %
VA-PRE: 4.04 L
VC-%PRED-PRE: 79 %
VC-PRE: 3.06 L
VC-PRED: 3.85 L

## 2019-03-02 ENCOUNTER — TELEPHONE (OUTPATIENT)
Dept: PULMONOLOGY | Facility: CLINIC | Age: 64
End: 2019-03-02

## 2019-03-02 DIAGNOSIS — J84.9 ILD (INTERSTITIAL LUNG DISEASE) (H): ICD-10-CM

## 2019-03-02 RX ORDER — LEVOFLOXACIN 500 MG/1
500 TABLET, FILM COATED ORAL DAILY
Qty: 10 TABLET | Refills: 0 | Status: SHIPPED | OUTPATIENT
Start: 2019-03-02 | End: 2019-03-13

## 2019-03-02 NOTE — TELEPHONE ENCOUNTER
Patient with recent head cold that has moved into her chest and as with previous events like this she has been told to call immediately per Dr. Snow to get treatment with Abx. She had levaquin last time that worked very well. She did develop a small amount of tendonitis and we discussed that she should not work out as hard when she is ill and this antibiotic will predispose her again. She understood and said that it worked well enough she will is OK trying again and will take a break for strenuous activities which will be good for illness as well.      Levofloxacin 500mg x10 days sent to her pharmacy    Dominic Brooke MD  Pulmonary & Critical Care Fellow  [phone] (Text Page)

## 2019-03-13 ENCOUNTER — OFFICE VISIT (OUTPATIENT)
Dept: PULMONOLOGY | Facility: CLINIC | Age: 64
End: 2019-03-13
Attending: INTERNAL MEDICINE
Payer: COMMERCIAL

## 2019-03-13 VITALS
HEIGHT: 69 IN | DIASTOLIC BLOOD PRESSURE: 112 MMHG | OXYGEN SATURATION: 95 % | BODY MASS INDEX: 28.58 KG/M2 | SYSTOLIC BLOOD PRESSURE: 163 MMHG | HEART RATE: 79 BPM | RESPIRATION RATE: 17 BRPM | WEIGHT: 193 LBS

## 2019-03-13 DIAGNOSIS — J84.9 ILD (INTERSTITIAL LUNG DISEASE) (H): Primary | ICD-10-CM

## 2019-03-13 PROCEDURE — G0463 HOSPITAL OUTPT CLINIC VISIT: HCPCS | Mod: ZF

## 2019-03-13 RX ORDER — LOSARTAN POTASSIUM 50 MG/1
50 TABLET ORAL DAILY
COMMUNITY

## 2019-03-13 ASSESSMENT — MIFFLIN-ST. JEOR: SCORE: 1494.82

## 2019-03-13 ASSESSMENT — PAIN SCALES - GENERAL: PAINLEVEL: NO PAIN (0)

## 2019-03-13 NOTE — LETTER
3/13/2019       RE: Windy Antonio  [address] [place] [address]     Dear Colleague,    Thank you for referring your patient, Windy Antonio, to the [facility] at [facility]. Please see a copy of my visit note below.    HISTORY OF PRESENT ILLNESS:  The patient is a 64-year-old female with interstitial lung disease.  The patient underwent a lung biopsy which was not definitive for IPF.  There was little or no subpleural fibrosis on the CT scan; however, the CT scan showed findings were suggestive of IPF.  The patient has also had a history of breast cancer for which she received Cytoxan in 2010 and also radiation therapy.  Currently, the etiology of her ILD is not clear, whether this is related to the radiation and chemotherapy that the patient got or whether the patient could have IPF.  Therefore, we have been following the patient's pulmonary function test.  Since her last visit with me, she has noted no change in her breathing in terms of shortness of breath; however, she did have a cold and a cough for which she took a 10-day course of Levaquin with resolution of her symptoms.  She has been off for Levaquin for 3 days now.  Again, she is breathing well, exercising well and she is not on oxygen.      REVIEW OF SYSTEMS:    CARDIAC:  She denies chest pain with exertion.  No syncope, no dizziness, no palpitations.   GASTROINTESTINAL:  She denies GERD or heartburn-like symptoms.   MUSCULOSKELETAL:  She denies arthritic symptoms.  She did develop a little bit of shoulder tenderness while on the Levaquin.      PHYSICAL EXAMINATION:   VITAL SIGNS:  Blood pressure was high today at 163/112.  She has been started on blood pressure medications from her primary care physician.  Her O2 sats are 95% on room air.   HEENT:  Unremarkable.  Eyes are anicteric.  Mouth and throat no lesions.   NECK:  Supple, no thyromegaly or adenopathy.   CHEST:   She has faint crackles in the bases of the lungs which she previously had.  Otherwise, the lungs are clear and unchanged.   CARDIOVASCULAR:  Regular rate and rhythm, normal S1 and S2.  Murmur is not appreciated.   ABDOMEN:  No hepatosplenomegaly, nontender.  The patient is somewhat overweight.   EXTREMITIES:  Without clubbing, cyanosis or edema.     JOINTS:  No arthritis is notable, no bogginess in the joints or redness of the joints are noted.   SKIN:  No rashes noted.   LYMPH:  No adenopathy is appreciated.      PULMONARY FUNCTION TESTS:  PFTs today are stable to slightly improved compared to what they were at the last visit with me.  FEV1 was 2.67 or 94%.  FVC was 3.17 or 87% of predicted and the diffusion capacity was 68% of predicted.      ASSESSMENT AND PLAN:  In summary then, Mrs. Antonio is a 64-year-old lady with interstitial lung disease.  The etiology of her ILD is not clear in whether this could be related to the chemotherapy and radiation therapy from her breast cancer versus IPF.  Currently the patient remains asymptomatic and her PFTs are stable to slightly improved.  Her O2 sats are 95% on room air.  I will have her follow up with me in 6 months with repeat pulmonary function tests and a 6-minute walk at that time.  If there are any changes noted, we may need to repeat a noncontrast chest CT scan to see whether there is evidence of progression of her disease.  Currently, there has not been evidence of progression which again makes it unclear whether or not this is IPF or whether this could have been related to the chemo and radiation therapy that she received from her breast cancer.     Aaron Snow  Pulmonary/Critical Care  March 13, 2019 12:06 PM

## 2019-03-13 NOTE — PROGRESS NOTES
HISTORY OF PRESENT ILLNESS:  The patient is a 64-year-old female with interstitial lung disease.  The patient underwent a lung biopsy which was not definitive for IPF.  There was little or no subpleural fibrosis on the CT scan; however, the CT scan showed findings were suggestive of IPF.  The patient has also had a history of breast cancer for which she received Cytoxan in 2010 and also radiation therapy.  Currently, the etiology of her ILD is not clear, whether this is related to the radiation and chemotherapy that the patient got or whether the patient could have IPF.  Therefore, we have been following the patient's pulmonary function test.  Since her last visit with me, she has noted no change in her breathing in terms of shortness of breath; however, she did have a cold and a cough for which she took a 10-day course of Levaquin with resolution of her symptoms.  She has been off for Levaquin for 3 days now.  Again, she is breathing well, exercising well and she is not on oxygen.      REVIEW OF SYSTEMS:    CARDIAC:  She denies chest pain with exertion.  No syncope, no dizziness, no palpitations.   GASTROINTESTINAL:  She denies GERD or heartburn-like symptoms.   MUSCULOSKELETAL:  She denies arthritic symptoms.  She did develop a little bit of shoulder tenderness while on the Levaquin.      PHYSICAL EXAMINATION:   VITAL SIGNS:  Blood pressure was high today at 163/112.  She has been started on blood pressure medications from her primary care physician.  Her O2 sats are 95% on room air.   HEENT:  Unremarkable.  Eyes are anicteric.  Mouth and throat no lesions.   NECK:  Supple, no thyromegaly or adenopathy.   CHEST:  She has faint crackles in the bases of the lungs which she previously had.  Otherwise, the lungs are clear and unchanged.   CARDIOVASCULAR:  Regular rate and rhythm, normal S1 and S2.  Murmur is not appreciated.   ABDOMEN:  No hepatosplenomegaly, nontender.  The patient is somewhat overweight.    EXTREMITIES:  Without clubbing, cyanosis or edema.     JOINTS:  No arthritis is notable, no bogginess in the joints or redness of the joints are noted.   SKIN:  No rashes noted.   LYMPH:  No adenopathy is appreciated.      PULMONARY FUNCTION TESTS:  PFTs today are stable to slightly improved compared to what they were at the last visit with me.  FEV1 was 2.67 or 94%.  FVC was 3.17 or 87% of predicted and the diffusion capacity was 68% of predicted.      ASSESSMENT AND PLAN:  In summary then, Mrs. Antonio is a 64-year-old lady with interstitial lung disease.  The etiology of her ILD is not clear in whether this could be related to the chemotherapy and radiation therapy from her breast cancer versus IPF.  Currently the patient remains asymptomatic and her PFTs are stable to slightly improved.  Her O2 sats are 95% on room air.  I will have her follow up with me in 6 months with repeat pulmonary function tests and a 6-minute walk at that time.  If there are any changes noted, we may need to repeat a noncontrast chest CT scan to see whether there is evidence of progression of her disease.  Currently, there has not been evidence of progression which again makes it unclear whether or not this is IPF or whether this could have been related to the chemo and radiation therapy that she received from her breast cancer.         Aaron Snow  Pulmonary/Critical Care  March 13, 2019 12:06 PM              Answers for HPI/ROS submitted by the patient on 3/10/2019   General Symptoms: No  Skin Symptoms: No  HENT Symptoms: No  EYE SYMPTOMS: No  HEART SYMPTOMS: No  LUNG SYMPTOMS: No  INTESTINAL SYMPTOMS: No  URINARY SYMPTOMS: No  GYNECOLOGIC SYMPTOMS: No  BREAST SYMPTOMS: No  SKELETAL SYMPTOMS: No  BLOOD SYMPTOMS: No  NERVOUS SYSTEM SYMPTOMS: No  MENTAL HEALTH SYMPTOMS: No

## 2019-03-14 LAB
DLCOUNC-%PRED-PRE: 68 %
DLCOUNC-PRE: 16.03 ML/MIN/MMHG
DLCOUNC-PRED: 23.53 ML/MIN/MMHG
ERV-%PRED-PRE: 129 %
ERV-PRE: 1.07 L
ERV-PRED: 0.82 L
EXPTIME-PRE: 7.89 SEC
FEF2575-%PRED-PRE: 124 %
FEF2575-PRE: 2.98 L/SEC
FEF2575-PRED: 2.38 L/SEC
FEFMAX-%PRED-PRE: 120 %
FEFMAX-PRE: 8.33 L/SEC
FEFMAX-PRED: 6.92 L/SEC
FEV1-%PRED-PRE: 94 %
FEV1-PRE: 2.67 L
FEV1FEV6-PRE: 84 %
FEV1FEV6-PRED: 80 %
FEV1FVC-PRE: 84 %
FEV1FVC-PRED: 78 %
FEV1SVC-PRE: 84 %
FEV1SVC-PRED: 74 %
FIFMAX-PRE: 6.45 L/SEC
FVC-%PRED-PRE: 87 %
FVC-PRE: 3.17 L
FVC-PRED: 3.64 L
IC-%PRED-PRE: 70 %
IC-PRE: 2.11 L
IC-PRED: 3.02 L
VA-%PRED-PRE: 73 %
VA-PRE: 4.29 L
VC-%PRED-PRE: 82 %
VC-PRE: 3.18 L
VC-PRED: 3.84 L

## 2019-09-11 ENCOUNTER — OFFICE VISIT (OUTPATIENT)
Dept: PULMONOLOGY | Facility: CLINIC | Age: 64
End: 2019-09-11
Attending: INTERNAL MEDICINE
Payer: COMMERCIAL

## 2019-09-11 VITALS
HEIGHT: 69 IN | WEIGHT: 186 LBS | SYSTOLIC BLOOD PRESSURE: 142 MMHG | OXYGEN SATURATION: 95 % | BODY MASS INDEX: 27.55 KG/M2 | HEART RATE: 79 BPM | RESPIRATION RATE: 17 BRPM | DIASTOLIC BLOOD PRESSURE: 92 MMHG

## 2019-09-11 DIAGNOSIS — J84.9 ILD (INTERSTITIAL LUNG DISEASE) (H): Primary | ICD-10-CM

## 2019-09-11 LAB
6 MIN WALK (FT): 1410 FT
6 MIN WALK (M): 430 M
DLCOUNC-%PRED-PRE: 72 %
DLCOUNC-PRE: 17.06 ML/MIN/MMHG
DLCOUNC-PRED: 23.53 ML/MIN/MMHG
ERV-%PRED-PRE: 101 %
ERV-PRE: 0.87 L
ERV-PRED: 0.85 L
EXPTIME-PRE: 5.46 SEC
FEF2575-%PRED-PRE: 125 %
FEF2575-PRE: 2.99 L/SEC
FEF2575-PRED: 2.38 L/SEC
FEFMAX-%PRED-PRE: 114 %
FEFMAX-PRE: 7.91 L/SEC
FEFMAX-PRED: 6.92 L/SEC
FEV1-%PRED-PRE: 92 %
FEV1-PRE: 2.61 L
FEV1FEV6-PRE: 85 %
FEV1FEV6-PRED: 80 %
FEV1FVC-PRE: 85 %
FEV1FVC-PRED: 78 %
FEV1SVC-PRE: 83 %
FEV1SVC-PRED: 74 %
FIFMAX-PRE: 5.48 L/SEC
FVC-%PRED-PRE: 83 %
FVC-PRE: 3.06 L
FVC-PRED: 3.64 L
IC-%PRED-PRE: 75 %
IC-PRE: 2.26 L
IC-PRED: 2.99 L
VA-%PRED-PRE: 71 %
VA-PRE: 4.15 L
VC-%PRED-PRE: 81 %
VC-PRE: 3.13 L
VC-PRED: 3.84 L

## 2019-09-11 PROCEDURE — G0463 HOSPITAL OUTPT CLINIC VISIT: HCPCS | Mod: ZF

## 2019-09-11 ASSESSMENT — MIFFLIN-ST. JEOR: SCORE: 1463.07

## 2019-09-11 ASSESSMENT — PAIN SCALES - GENERAL: PAINLEVEL: NO PAIN (0)

## 2019-09-11 NOTE — LETTER
9/11/2019       RE: Windy Antonio  [address] [place] [id number]     Dear Colleague,    Thank you for referring your patient, Windy Antonio, to the [facility] FOR LUNG SCIENCE AND HEALTH at [facility]. Please see a copy of my visit note below.    HISTORY OF PRESENT ILLNESS:  Ms. Antonio is a 64-year-old female with interstitial lung disease.  The patient underwent a lung biopsy, which was not definitive for IPF.  There was little or no subpleural fibrosis; however, the CT scan showed findings that were suggestive of IPF.  Of note, the patient also has a history of breast cancer, for which she received Cytoxan in 2010 and radiation therapy.  Currently, the etiology of her ILD is not clear and it could be related to prior radiation treatment and chemotherapy  or possibly IPF.  She returns for a 6 month followup today with no change in her breathing symptoms.  She remains asymptomatic from a pulmonary standpoint, and she has had no respiratory tract infections.  The only symptom she has is an occasional cough.      REVIEW OF SYSTEMS   CARDIAC:  She denies chest pain with exertion.  No syncope, dizziness, palpitations or lower extremity edema.   GASTROINTESTINAL:  She denies GERD or heartburn-like symptoms.   MUSCULOSKELETAL:  She denies arthritic symptoms.      PHYSICAL EXAMINATION:   VITAL SIGNS:  Blood pressure is 142/92.  Pulse is 79.  Respiratory rate is 17.  O2 sats are 95% on room air, similar to what they were 6 months ago.   HEENT:  Eyes are anicteric.  Mouth and throat are without lesions.   NECK:  No adenopathy.   CHEST:  Faint crackles in the bases of the lungs, otherwise clear.   HEART:  Regular rate and rhythm, normal S1 and S2, no murmur.   ABDOMEN:  No hepatosplenomegaly, nontender.   EXTREMITIES:  Without clubbing, cyanosis or edema.   JOINTS:  No evidence of arthritis is noted.     SKIN:  No rashes are noted.      PULMONARY FUNCTION TESTS:  She  had a 6 minute walk today, not on oxygen.  She started out at 96%.  The lowest she got was down to 91%, which is similar to a 6 minute walk a year ago.  Spirometry was 2.61 and 92% of predicted, FVC 3.06 and 83% of predicted and diffusion capacity 72% of predicted.  All these are stable compared with prior PFTs.      ASSESSMENT AND PLAN:  In summary then, Ms. Antonio is a 64-year-old lady with interstitial lung disease.  The etiology of her ILD is not clear. It could be related to prior chemotherapy and radiation therapy or slowly progressive IPF remains a possiblity.  I have been following her for a year's period of time so far.  She remains asymptomatic without having evidence of progression on her PFTs.  I will continue to follow her.  I will see her back in 6 months with repeat PFTs and a CT scan at that point in time.  Her last CT scan was obtained in 07/2018, so it will be about a year and a half from her previous CT scan.  She has been instructed to get the flu shot later this fall and to call us if she gets a respiratory tract infection.         Aaron Snow MD  Pulmonary/Critical Care  September 11, 2019 2:52 PM

## 2019-09-11 NOTE — PROGRESS NOTES
HISTORY OF PRESENT ILLNESS:  Ms. Antnoio is a 64-year-old female with interstitial lung disease.  The patient underwent a lung biopsy, which was not definitive for IPF.  There was little or no subpleural fibrosis; however, the CT scan showed findings that were suggestive of IPF.  Of note, the patient also has a history of breast cancer, for which she received Cytoxan in 2010 and radiation therapy.  Currently, the etiology of her ILD is not clear and it could be related to prior radiation treatment and chemotherapy  or possibly IPF.  She returns for a 6 month followup today with no change in her breathing symptoms.  She remains asymptomatic from a pulmonary standpoint, and she has had no respiratory tract infections.  The only symptom she has is an occasional cough.      REVIEW OF SYSTEMS   CARDIAC:  She denies chest pain with exertion.  No syncope, dizziness, palpitations or lower extremity edema.   GASTROINTESTINAL:  She denies GERD or heartburn-like symptoms.   MUSCULOSKELETAL:  She denies arthritic symptoms.      PHYSICAL EXAMINATION:   VITAL SIGNS:  Blood pressure is 142/92.  Pulse is 79.  Respiratory rate is 17.  O2 sats are 95% on room air, similar to what they were 6 months ago.   HEENT:  Eyes are anicteric.  Mouth and throat are without lesions.   NECK:  No adenopathy.   CHEST:  Faint crackles in the bases of the lungs, otherwise clear.   HEART:  Regular rate and rhythm, normal S1 and S2, no murmur.   ABDOMEN:  No hepatosplenomegaly, nontender.   EXTREMITIES:  Without clubbing, cyanosis or edema.   JOINTS:  No evidence of arthritis is noted.     SKIN:  No rashes are noted.      PULMONARY FUNCTION TESTS:  She had a 6 minute walk today, not on oxygen.  She started out at 96%.  The lowest she got was down to 91%, which is similar to a 6 minute walk a year ago.  Spirometry was 2.61 and 92% of predicted, FVC 3.06 and 83% of predicted and diffusion capacity 72% of predicted.  All these are stable compared with  prior PFTs.      ASSESSMENT AND PLAN:  In summary then, Ms. Antonio is a 64-year-old lady with interstitial lung disease.  The etiology of her ILD is not clear. It could be related to prior chemotherapy and radiation therapy or slowly progressive IPF remains a possiblity.  I have been following her for a year's period of time so far.  She remains asymptomatic without having evidence of progression on her PFTs.  I will continue to follow her.  I will see her back in 6 months with repeat PFTs and a CT scan at that point in time.  Her last CT scan was obtained in 07/2018, so it will be about a year and a half from her previous CT scan.  She has been instructed to get the flu shot later this fall and to call us if she gets a respiratory tract infection.         Aaron Snow MD  Pulmonary/Critical Care  September 11, 2019 2:52 PM

## 2019-09-28 ENCOUNTER — HEALTH MAINTENANCE LETTER (OUTPATIENT)
Age: 64
End: 2019-09-28

## 2019-10-27 ENCOUNTER — HEALTH MAINTENANCE LETTER (OUTPATIENT)
Age: 64
End: 2019-10-27

## 2020-03-11 ENCOUNTER — ANCILLARY PROCEDURE (OUTPATIENT)
Dept: CT IMAGING | Facility: CLINIC | Age: 65
End: 2020-03-11
Attending: INTERNAL MEDICINE
Payer: COMMERCIAL

## 2020-03-11 ENCOUNTER — OFFICE VISIT (OUTPATIENT)
Dept: PULMONOLOGY | Facility: CLINIC | Age: 65
End: 2020-03-11
Attending: INTERNAL MEDICINE
Payer: COMMERCIAL

## 2020-03-11 VITALS
DIASTOLIC BLOOD PRESSURE: 96 MMHG | HEIGHT: 69 IN | RESPIRATION RATE: 17 BRPM | OXYGEN SATURATION: 97 % | WEIGHT: 185 LBS | HEART RATE: 77 BPM | BODY MASS INDEX: 27.4 KG/M2 | SYSTOLIC BLOOD PRESSURE: 129 MMHG

## 2020-03-11 DIAGNOSIS — J84.9 ILD (INTERSTITIAL LUNG DISEASE) (H): ICD-10-CM

## 2020-03-11 DIAGNOSIS — J84.9 ILD (INTERSTITIAL LUNG DISEASE) (H): Primary | ICD-10-CM

## 2020-03-11 ASSESSMENT — PAIN SCALES - GENERAL: PAINLEVEL: NO PAIN (0)

## 2020-03-11 ASSESSMENT — MIFFLIN-ST. JEOR: SCORE: 1453.53

## 2020-03-11 NOTE — NURSING NOTE
Chief Complaint   Patient presents with     RECHECK     ILD     Medications reviewed and vital signs taken.   Kei Bartholomew, IVY    
Anus patent

## 2020-03-11 NOTE — LETTER
3/11/2020       RE: Windy Antonio  [address]  [address] [address]  [place] [address]     Dear Colleague,    Thank you for referring your patient, Windy Antonio, to the [facility] FOR LUNG SCIENCE AND HEALTH at [facility]. Please see a copy of my visit note below.    HISTORY OF PRESENT ILLNESS:  Ms. Antonio is a 65-year-old female with interstitial lung disease who underwent a lung biopsy which was not definitive for IPF.  There was little or no subpleural fibrosis; however, the CT scan showed findings that were suggestive of IPF.  Of note, the patient also has a history of breast cancer for which she received Cytoxan in 2010 and radiation therapy.  Currently, the etiology of her ILD is unclear.  It could be related to prior radiation and chemotherapy versus possible IPF.  Therefore, she has been followed in the Pulmonary Clinic.  Her last visit with me was in 09/2019.  She returns today asymptomatic from a pulmonary standpoint with no respiratory tract infections.      PHYSICAL EXAMINATION:   VITAL SIGNS:  Stable.  Her O2 sats were 97% on room air.   HEENT:  Eyes are anicteric.  Mouth and throat are without lesions.   NECK:  No adenopathy.   CHEST:  Faint crackles in the bases of the lungs, otherwise clear.  No change.   HEART:  Regular rate and rhythm, normal S1 and S2, no murmur.   ABDOMEN:  No hepatosplenomegaly, nontender.   EXTREMITIES:  Without clubbing, cyanosis or edema.   JOINTS:  No evidence of arthritis.   SKIN:  No rash.      PULMONARY FUNCTION TESTS:  PFTs are stable.  FVC was 2.97 - 82% of predicted.  FEV1 2.53 - 90% of predicted and diffusion capacity 64% of predicted.      IMAGING:  The chest CT scan was obtained on her today which showed stable findings, stable fibrosis with no evidence of progression.  The formal read was no significant change in the pattern of ILD which could resemble UIP type pneumonia or fibrotic NSIP.      ASSESSMENT AND PLAN:   In summary then, Mrs. Antonio is a 65-year-old lady with interstitial lung disease.  The etiology of her ILD is not clear, my thought is that given the stability of the things, she is relatively asymptomatic, that this is likely related to prior chemotherapy and radiation therapy.  I think it is less likely IPF, although slowly progressive IPF does remain a possibility.  I will have the patient follow up with me in 6 months with repeat pulmonary function tests at that time.         Aaron Snow MD  Pulmonary/Critical Care  March 11, 2020 3:02 PM            Answers for HPI/ROS submitted by the patient on 3/7/2020   General Symptoms: No  Skin Symptoms: No  HENT Symptoms: No  EYE SYMPTOMS: No  HEART SYMPTOMS: No  LUNG SYMPTOMS: No  INTESTINAL SYMPTOMS: No  URINARY SYMPTOMS: No  GYNECOLOGIC SYMPTOMS: No  BREAST SYMPTOMS: No  SKELETAL SYMPTOMS: No  BLOOD SYMPTOMS: No  NERVOUS SYSTEM SYMPTOMS: No  MENTAL HEALTH SYMPTOMS: No      Again, thank you for allowing me to participate in the care of your patient.      Sincerely,    Aaron Snow MD

## 2020-03-11 NOTE — PROGRESS NOTES
HISTORY OF PRESENT ILLNESS:  Ms. Antonio is a 65-year-old female with interstitial lung disease who underwent a lung biopsy which was not definitive for IPF.  There was little or no subpleural fibrosis; however, the CT scan showed findings that were suggestive of IPF.  Of note, the patient also has a history of breast cancer for which she received Cytoxan in 2010 and radiation therapy.  Currently, the etiology of her ILD is unclear.  It could be related to prior radiation and chemotherapy versus possible IPF.  Therefore, she has been followed in the Pulmonary Clinic.  Her last visit with me was in 09/2019.  She returns today asymptomatic from a pulmonary standpoint with no respiratory tract infections.      PHYSICAL EXAMINATION:   VITAL SIGNS:  Stable.  Her O2 sats were 97% on room air.   HEENT:  Eyes are anicteric.  Mouth and throat are without lesions.   NECK:  No adenopathy.   CHEST:  Faint crackles in the bases of the lungs, otherwise clear.  No change.   HEART:  Regular rate and rhythm, normal S1 and S2, no murmur.   ABDOMEN:  No hepatosplenomegaly, nontender.   EXTREMITIES:  Without clubbing, cyanosis or edema.   JOINTS:  No evidence of arthritis.   SKIN:  No rash.      PULMONARY FUNCTION TESTS:  PFTs are stable.  FVC was 2.97 - 82% of predicted.  FEV1 2.53 - 90% of predicted and diffusion capacity 64% of predicted.      IMAGING:  The chest CT scan was obtained on her today which showed stable findings, stable fibrosis with no evidence of progression.  The formal read was no significant change in the pattern of ILD which could resemble UIP type pneumonia or fibrotic NSIP.      ASSESSMENT AND PLAN:  In summary then, Mrs. Antonio is a 65-year-old lady with interstitial lung disease.  The etiology of her ILD is not clear, my thought is that given the stability of the things, she is relatively asymptomatic, that this is likely related to prior chemotherapy and radiation therapy.  I think it is less likely IPF,  although slowly progressive IPF does remain a possibility.  I will have the patient follow up with me in 6 months with repeat pulmonary function tests at that time.         Aaron Snow MD  Pulmonary/Critical Care  March 11, 2020 3:02 PM            Answers for HPI/ROS submitted by the patient on 3/7/2020   General Symptoms: No  Skin Symptoms: No  HENT Symptoms: No  EYE SYMPTOMS: No  HEART SYMPTOMS: No  LUNG SYMPTOMS: No  INTESTINAL SYMPTOMS: No  URINARY SYMPTOMS: No  GYNECOLOGIC SYMPTOMS: No  BREAST SYMPTOMS: No  SKELETAL SYMPTOMS: No  BLOOD SYMPTOMS: No  NERVOUS SYSTEM SYMPTOMS: No  MENTAL HEALTH SYMPTOMS: No

## 2020-03-16 LAB
DLCOUNC-%PRED-PRE: 64 %
DLCOUNC-PRE: 15.23 ML/MIN/MMHG
DLCOUNC-PRED: 23.44 ML/MIN/MMHG
ERV-%PRED-PRE: 64 %
ERV-PRE: 0.52 L
ERV-PRED: 0.81 L
EXPTIME-PRE: 7.02 SEC
FEF2575-%PRED-PRE: 118 %
FEF2575-PRE: 2.76 L/SEC
FEF2575-PRED: 2.34 L/SEC
FEFMAX-%PRED-PRE: 115 %
FEFMAX-PRE: 7.91 L/SEC
FEFMAX-PRED: 6.86 L/SEC
FEV1-%PRED-PRE: 90 %
FEV1-PRE: 2.53 L
FEV1FEV6-PRE: 84 %
FEV1FEV6-PRED: 80 %
FEV1FVC-PRE: 85 %
FEV1FVC-PRED: 78 %
FEV1SVC-PRE: 86 %
FEV1SVC-PRED: 73 %
FIFMAX-PRE: 4.65 L/SEC
FVC-%PRED-PRE: 82 %
FVC-PRE: 2.97 L
FVC-PRED: 3.61 L
IC-%PRED-PRE: 79 %
IC-PRE: 2.41 L
IC-PRED: 3.01 L
VA-%PRED-PRE: 68 %
VA-PRE: 3.97 L
VC-%PRED-PRE: 76 %
VC-PRE: 2.93 L
VC-PRED: 3.82 L

## 2020-07-22 ENCOUNTER — DOCUMENTATION ONLY (OUTPATIENT)
Dept: CARE COORDINATION | Facility: CLINIC | Age: 65
End: 2020-07-22

## 2021-01-10 ENCOUNTER — HEALTH MAINTENANCE LETTER (OUTPATIENT)
Age: 66
End: 2021-01-10

## 2021-03-13 ENCOUNTER — HEALTH MAINTENANCE LETTER (OUTPATIENT)
Age: 66
End: 2021-03-13

## 2021-04-28 ENCOUNTER — IMMUNIZATION (OUTPATIENT)
Dept: NURSING | Facility: CLINIC | Age: 66
End: 2021-04-28
Payer: COMMERCIAL

## 2021-04-28 ENCOUNTER — OFFICE VISIT (OUTPATIENT)
Dept: PULMONOLOGY | Facility: CLINIC | Age: 66
End: 2021-04-28
Attending: INTERNAL MEDICINE
Payer: COMMERCIAL

## 2021-04-28 ENCOUNTER — TELEPHONE (OUTPATIENT)
Dept: PULMONOLOGY | Facility: CLINIC | Age: 66
End: 2021-04-28

## 2021-04-28 VITALS
RESPIRATION RATE: 18 BRPM | BODY MASS INDEX: 27.62 KG/M2 | HEART RATE: 90 BPM | OXYGEN SATURATION: 95 % | SYSTOLIC BLOOD PRESSURE: 127 MMHG | WEIGHT: 187 LBS | DIASTOLIC BLOOD PRESSURE: 88 MMHG

## 2021-04-28 DIAGNOSIS — J84.9 ILD (INTERSTITIAL LUNG DISEASE) (H): Primary | ICD-10-CM

## 2021-04-28 DIAGNOSIS — J84.9 ILD (INTERSTITIAL LUNG DISEASE) (H): ICD-10-CM

## 2021-04-28 LAB
DLCOUNC-%PRED-PRE: 65 %
DLCOUNC-PRE: 15.19 ML/MIN/MMHG
DLCOUNC-PRED: 23.35 ML/MIN/MMHG
ERV-%PRED-PRE: 68 %
ERV-PRE: 0.56 L
ERV-PRED: 0.82 L
EXPTIME-PRE: 6.07 SEC
FEF2575-%PRED-PRE: 117 %
FEF2575-PRE: 2.7 L/SEC
FEF2575-PRED: 2.29 L/SEC
FEFMAX-%PRED-PRE: 115 %
FEFMAX-PRE: 7.85 L/SEC
FEFMAX-PRED: 6.79 L/SEC
FEV1-%PRED-PRE: 88 %
FEV1-PRE: 2.44 L
FEV1FEV6-PRE: 84 %
FEV1FEV6-PRED: 80 %
FEV1FVC-PRE: 84 %
FEV1FVC-PRED: 78 %
FEV1SVC-PRE: 84 %
FEV1SVC-PRED: 73 %
FIFMAX-PRE: 5.43 L/SEC
FVC-%PRED-PRE: 81 %
FVC-PRE: 2.9 L
FVC-PRED: 3.57 L
IC-%PRED-PRE: 78 %
IC-PRE: 2.33 L
IC-PRED: 2.98 L
VA-%PRED-PRE: 67 %
VA-PRE: 3.9 L
VC-%PRED-PRE: 76 %
VC-PRE: 2.89 L
VC-PRED: 3.8 L

## 2021-04-28 PROCEDURE — 94729 DIFFUSING CAPACITY: CPT | Performed by: INTERNAL MEDICINE

## 2021-04-28 PROCEDURE — 91300 PR COVID VAC PFIZER DIL RECON 30 MCG/0.3 ML IM: CPT

## 2021-04-28 PROCEDURE — 94375 RESPIRATORY FLOW VOLUME LOOP: CPT | Performed by: INTERNAL MEDICINE

## 2021-04-28 PROCEDURE — 0001A PR COVID VAC PFIZER DIL RECON 30 MCG/0.3 ML IM: CPT

## 2021-04-28 PROCEDURE — 99214 OFFICE O/P EST MOD 30 MIN: CPT | Mod: 25 | Performed by: INTERNAL MEDICINE

## 2021-04-28 RX ORDER — AMLODIPINE BESYLATE AND ATORVASTATIN CALCIUM 10; 10 MG/1; MG/1
TABLET, FILM COATED ORAL
COMMUNITY

## 2021-04-28 ASSESSMENT — PAIN SCALES - GENERAL: PAINLEVEL: NO PAIN (0)

## 2021-04-28 NOTE — NURSING NOTE
Chief Complaint   Patient presents with     Interstitial Lung Disease (ILD)     Follow up      Medications reviewed and updated.  Vitals taken  Marti Mondragon CMA

## 2021-04-28 NOTE — TELEPHONE ENCOUNTER
Pt was initially supposed to get CT done today at 11AM per imaging call center, however, was incorrectly scheduled for tomorrow. Pt had another obligation and needed to leave, thus I called and LVM with my direct number to get this arranged and informed her the CT for tomorrow would be canceled.

## 2021-04-28 NOTE — PROGRESS NOTES
HISTORY OF PRESENT ILLNESS:  Windy is a 66-year-old female with interstitial lung disease, who underwent a lung biopsy, which was not definitive for IPF.  There was little or no subpleural fibrosis.  However, the CT scan did show findings that were suggestive of IPF.  Of note, the patient had a history of breast cancer for which she received Cytoxan in 2010 and radiation therapy.  Therefore, right now the etiology of his ILD is unclear.  It is unclear whether this could be related to the prior radiation and chemotherapy versus possible IPF.  Because of this, we have been following Windy in the Pulmonary Clinic.  Her last visit with me was quite some time ago in 03/2020 and since that time, she reports that she is doing well.  She notices a little bit of congestion; however, she denies shortness of breath or dyspnea on exertion.    PHYSICAL EXAMINATION:  VITAL SIGNS:  On physical examination today, the vital signs are stable.  Her O2 sats are 95% on room air.  At her last visit with me a year ago, they were 97%.  HEENT:  Eyes are nonicteric.  Mouth and throat are without lesions.  NECK:  Neck is supple without thyromegaly.  LYMPH:  No appreciable adenopathy.  CHEST:  Maybe some faint crackles in the bases, otherwise clear.  It does not sound different to me.  HEART:  Shows regular rate and rhythm, normal S1, S2.  ABDOMEN:  No hepatosplenomegaly, nontender.  EXTREMITIES:  Without clubbing, cyanosis or edema.  JOINTS:  No evidence of arthritis.  SKIN:  No rash.    Pulmonary function test done today.  Her FEV1 was 2.44, 88% of predicted, FVC 2.90, 81% of predicted, and diffusion capacity is 65% of predicted.  The PFTs are a little bit lower than what they were a year ago, at which time the FVC was 2.97 and FEV1 was 2.53, and the time before that, her FVC was 3.06 and 2.61, so there has been sort of a slight downward trend in the PFTs.  I am not sure the significance of that.  Her last CT scan that she had done was  in 03/2020, a year ago, which showed stable fibrosis with no evidence of progression.    ASSESSMENT AND PLAN:  In summary then, Mrs. Antonio is a 66-year-old woman with interstitial lung disease.  The etiology is not clear.  This may be related to prior chemotherapy or radiation versus IPF.  Given that there has been a slight trend downward in the PFTs over the last couple years, my plan will be to repeat a noncontrast chest CT scan on her today to see whether there is any evidence that the fibrosis has been slowly progressive.  So I will do the CT scan today.  I have the patient scheduled for followup with me in 6 months with a repeat pulmonary function test, but if there has been any change in the CT scan, I may need to have her follow up with me sooner.    Of note, the patient is scheduled for her first COVID vaccine later today and she has been encouraged to proceed and get the vaccine.    Aaron Snow MD        Answers for HPI/ROS submitted by the patient on 4/27/2021   General Symptoms: No  Skin Symptoms: No  HENT Symptoms: No  EYE SYMPTOMS: No  HEART SYMPTOMS: No  LUNG SYMPTOMS: No  INTESTINAL SYMPTOMS: No  URINARY SYMPTOMS: No  GYNECOLOGIC SYMPTOMS: No  BREAST SYMPTOMS: No  SKELETAL SYMPTOMS: No  BLOOD SYMPTOMS: No  NERVOUS SYSTEM SYMPTOMS: No  MENTAL HEALTH SYMPTOMS: No

## 2021-04-28 NOTE — LETTER
4/28/2021         RE: Windy Antonio  [address]  [address] [address]  [place] [address]        Dear Colleague,    Thank you for referring your patient, Windy Antonio, to the [facility] FOR LUNG SCIENCE AND [facility] [place]. Please see a copy of my visit note below.    HISTORY OF PRESENT ILLNESS:  Windy is a 66-year-old female with interstitial lung disease, who underwent a lung biopsy, which was not definitive for IPF.  There was little or no subpleural fibrosis.  However, the CT scan did show findings that were suggestive of IPF.  Of note, the patient had a history of breast cancer for which she received Cytoxan in 2010 and radiation therapy.  Therefore, right now the etiology of his ILD is unclear.  It is unclear whether this could be related to the prior radiation and chemotherapy versus possible IPF.  Because of this, we have been following Windy in the Pulmonary Clinic.  Her last visit with me was quite some time ago in 03/2020 and since that time, she reports that she is doing well.  She notices a little bit of congestion; however, she denies shortness of breath or dyspnea on exertion.    PHYSICAL EXAMINATION:  VITAL SIGNS:  On physical examination today, the vital signs are stable.  Her O2 sats are 95% on room air.  At her last visit with me a year ago, they were 97%.  HEENT:  Eyes are nonicteric.  Mouth and throat are without lesions.  NECK:  Neck is supple without thyromegaly.  LYMPH:  No appreciable adenopathy.  CHEST:  Maybe some faint crackles in the bases, otherwise clear.  It does not sound different to me.  HEART:  Shows regular rate and rhythm, normal S1, S2.  ABDOMEN:  No hepatosplenomegaly, nontender.  EXTREMITIES:  Without clubbing, cyanosis or edema.  JOINTS:  No evidence of arthritis.  SKIN:  No rash.    Pulmonary function test done today.  Her FEV1 was 2.44, 88% of predicted, FVC 2.90, 81% of predicted, and diffusion capacity is 65% of predicted.  The PFTs  are a little bit lower than what they were a year ago, at which time the FVC was 2.97 and FEV1 was 2.53, and the time before that, her FVC was 3.06 and 2.61, so there has been sort of a slight downward trend in the PFTs.  I am not sure the significance of that.  Her last CT scan that she had done was in 03/2020, a year ago, which showed stable fibrosis with no evidence of progression.    ASSESSMENT AND PLAN:  In summary then, Mrs. Antonio is a 66-year-old woman with interstitial lung disease.  The etiology is not clear.  This may be related to prior chemotherapy or radiation versus IPF.  Given that there has been a slight trend downward in the PFTs over the last couple years, my plan will be to repeat a noncontrast chest CT scan on her today to see whether there is any evidence that the fibrosis has been slowly progressive.  So I will do the CT scan today.  I have the patient scheduled for followup with me in 6 months with a repeat pulmonary function test, but if there has been any change in the CT scan, I may need to have her follow up with me sooner.    Of note, the patient is scheduled for her first COVID vaccine later today and she has been encouraged to proceed and get the vaccine.    Aaron Snow MD

## 2021-05-05 ENCOUNTER — HOSPITAL ENCOUNTER (OUTPATIENT)
Dept: CT IMAGING | Facility: CLINIC | Age: 66
Discharge: HOME | End: 2021-05-05
Attending: INTERNAL MEDICINE | Admitting: INTERNAL MEDICINE
Payer: COMMERCIAL

## 2021-05-05 PROCEDURE — 71250 CT THORAX DX C-: CPT

## 2021-05-06 ENCOUNTER — PATIENT OUTREACH (OUTPATIENT)
Dept: PULMONOLOGY | Facility: CLINIC | Age: 66
End: 2021-05-06

## 2021-05-06 NOTE — TELEPHONE ENCOUNTER
Contacted patient after review with Dr. Snow regarding CT performed yesterday. Imaging notably stable, no changes to occur within current plan of care, follow up as planned.     Informed patient. Verbalized understanding.     Vanessa Perez RN, BSN  ILD Nurse Care Coordinator  (P) [phone]

## 2021-05-19 ENCOUNTER — IMMUNIZATION (OUTPATIENT)
Dept: NURSING | Facility: CLINIC | Age: 66
End: 2021-05-19
Attending: INTERNAL MEDICINE
Payer: COMMERCIAL

## 2021-05-19 PROCEDURE — 0002A PR COVID VAC PFIZER DIL RECON 30 MCG/0.3 ML IM: CPT

## 2021-05-19 PROCEDURE — 91300 PR COVID VAC PFIZER DIL RECON 30 MCG/0.3 ML IM: CPT

## 2021-07-07 DIAGNOSIS — R05.9 COUGH: Primary | ICD-10-CM

## 2021-07-07 DIAGNOSIS — R05.8 COUGH WITH EXPECTORATION: ICD-10-CM

## 2021-07-07 RX ORDER — GUAIFENESIN 600 MG/1
1200 TABLET, EXTENDED RELEASE ORAL 2 TIMES DAILY
Qty: 60 TABLET | Refills: 11 | Status: SHIPPED | OUTPATIENT
Start: 2021-07-07

## 2021-10-23 ENCOUNTER — HEALTH MAINTENANCE LETTER (OUTPATIENT)
Age: 66
End: 2021-10-23

## 2021-10-27 ENCOUNTER — OFFICE VISIT (OUTPATIENT)
Dept: PULMONOLOGY | Facility: CLINIC | Age: 66
End: 2021-10-27
Attending: INTERNAL MEDICINE
Payer: COMMERCIAL

## 2021-10-27 VITALS
WEIGHT: 185 LBS | HEART RATE: 79 BPM | OXYGEN SATURATION: 95 % | SYSTOLIC BLOOD PRESSURE: 122 MMHG | HEIGHT: 69 IN | BODY MASS INDEX: 27.4 KG/M2 | DIASTOLIC BLOOD PRESSURE: 82 MMHG

## 2021-10-27 DIAGNOSIS — J84.9 ILD (INTERSTITIAL LUNG DISEASE) (H): Primary | ICD-10-CM

## 2021-10-27 DIAGNOSIS — J84.9 ILD (INTERSTITIAL LUNG DISEASE) (H): ICD-10-CM

## 2021-10-27 LAB
DLCOUNC-%PRED-PRE: 61 %
DLCOUNC-PRE: 14.38 ML/MIN/MMHG
DLCOUNC-PRED: 23.35 ML/MIN/MMHG
ERV-%PRED-PRE: 55 %
ERV-PRE: 0.47 L
ERV-PRED: 0.84 L
EXPTIME-PRE: 7.76 SEC
FEF2575-%PRED-PRE: 126 %
FEF2575-PRE: 2.89 L/SEC
FEF2575-PRED: 2.29 L/SEC
FEFMAX-%PRED-PRE: 115 %
FEFMAX-PRE: 7.85 L/SEC
FEFMAX-PRED: 6.79 L/SEC
FEV1-%PRED-PRE: 88 %
FEV1-PRE: 2.44 L
FEV1FEV6-PRE: 85 %
FEV1FEV6-PRED: 80 %
FEV1FVC-PRE: 86 %
FEV1FVC-PRED: 78 %
FEV1SVC-PRE: 85 %
FEV1SVC-PRED: 73 %
FIFMAX-PRE: 5.33 L/SEC
FVC-%PRED-PRE: 79 %
FVC-PRE: 2.85 L
FVC-PRED: 3.57 L
IC-%PRED-PRE: 80 %
IC-PRE: 2.4 L
IC-PRED: 2.97 L
VA-%PRED-PRE: 68 %
VA-PRE: 3.95 L
VC-%PRED-PRE: 75 %
VC-PRE: 2.86 L
VC-PRED: 3.8 L

## 2021-10-27 PROCEDURE — 94375 RESPIRATORY FLOW VOLUME LOOP: CPT | Performed by: INTERNAL MEDICINE

## 2021-10-27 PROCEDURE — 94729 DIFFUSING CAPACITY: CPT | Performed by: INTERNAL MEDICINE

## 2021-10-27 PROCEDURE — 99214 OFFICE O/P EST MOD 30 MIN: CPT | Mod: 25 | Performed by: INTERNAL MEDICINE

## 2021-10-27 PROCEDURE — G0463 HOSPITAL OUTPT CLINIC VISIT: HCPCS | Mod: 25

## 2021-10-27 ASSESSMENT — PAIN SCALES - GENERAL: PAINLEVEL: NO PAIN (0)

## 2021-10-27 ASSESSMENT — MIFFLIN-ST. JEOR: SCORE: 1448.53

## 2021-10-27 NOTE — PROGRESS NOTES
HISTORY OF PRESENT ILLNESS:  Windy is a 66-year-old female with interstitial lung disease who underwent a VATS biopsy in 2017, which was not definitive for IPF.  The CT scan showed some changes of that were suggestive of IPF.  Of note, the patient had a history of breast cancer for which she received Cytoxan in 2010 and radiation therapy.  Her last visit with me was in 04/2021, six months ago.  Since that time, she continues to feel well without any change in symptoms and denies dyspnea on exertion.    PHYSICAL EXAMINATION:    VITAL SIGNS:  Vital signs are stable.  O2 sats are 95% on room air, which is similar to what they were in April.  HEENT:  Eyes are nonicteric.  Mouth and throat without lesions.  NECK:  Supple.  Without thyromegaly.  LYMPH:  No appreciable adenopathy.  CHEST:  There may be some very faint crackles in the bases, otherwise clear.  The lungs really sound good and unchanged compared to previous exams.  HEART:  Regular rate and rhythm.  Normal S1, S2.  ABDOMEN:  No hepatosplenomegaly, nontender.  EXTREMITIES:  Without clubbing, cyanosis or edema.  JOINTS:  No evidence of arthritis.  SKIN:  No rash.    PULMONARY FUNCTION TESTS:  Today, FEV1 2.44, 88% of predicted, FVC 2.85, 79% of predicted, diffusion capacity 61% of predicted.  At her last visit with me, her FEV1 was also 2.44, the FVC was 2.9 and diffusion capacity 65% predicted.  Overall, there has been just a slight downward trend in the PFTs, which has not been significant in terms of greater than 10% decline in the FVC.  Of note, she did have a CT scan done in April of this year which did not show clearcut evidence of progression of her disease.    ASSESSMENT AND PLAN:  In summary, then, Mrs. Antonio is a 66-year-old woman with interstitial lung disease.  The etiology is not clear.  Right now, the differential is whether this is related to prior chemotherapy and/or radiation versus IPF.  Currently, the patient remains asymptomatic.  Her O2  sats remain okay.  Her exam is unchanged with just minimal crackles.  A CT scan done 6 months ago showed stable fibrotic changes, and there has just been a very slight decrease in the pulmonary function tests.  It is unclear whether or not her ILD is indeed progressive at this point in time, and given that the patient continues to feel well, I will hold on considering an antifibrotic.  She was previously treated with Ofev by a previous physician, and that was poorly tolerated.  My plan will be to see the patient back in 6 months with repeat PFTs and chest CT scan at that time.          Aaron Snow MD  Pulmonary/Critical Care  October 27, 2021 12:04 PM

## 2021-10-27 NOTE — LETTER
10/27/2021         RE: Windy Antonio  [address]  [address] [address]  [place] [facility] [place] [address]        Dear Colleague,    Thank you for referring your patient, Windy Antonio, to the [facility] FOR LUNG SCIENCE AND [facility]. Please see a copy of my visit note below.    HISTORY OF PRESENT ILLNESS:  Windy is a 66-year-old female with interstitial lung disease who underwent a VATS biopsy in 2017, which was not definitive for IPF.  The CT scan showed some changes of that were suggestive of IPF.  Of note, the patient had a history of breast cancer for which she received Cytoxan in 2010 and radiation therapy.  Her last visit with me was in 04/2021, six months ago.  Since that time, she continues to feel well without any change in symptoms and denies dyspnea on exertion.    PHYSICAL EXAMINATION:    VITAL SIGNS:  Vital signs are stable.  O2 sats are 95% on room air, which is similar to what they were in April.  HEENT:  Eyes are nonicteric.  Mouth and throat without lesions.  NECK:  Supple.  Without thyromegaly.  LYMPH:  No appreciable adenopathy.  CHEST:  There may be some very faint crackles in the bases, otherwise clear.  The lungs really sound good and unchanged compared to previous exams.  HEART:  Regular rate and rhythm.  Normal S1, S2.  ABDOMEN:  No hepatosplenomegaly, nontender.  EXTREMITIES:  Without clubbing, cyanosis or edema.  JOINTS:  No evidence of arthritis.  SKIN:  No rash.    PULMONARY FUNCTION TESTS:  Today, FEV1 2.44, 88% of predicted, FVC 2.85, 79% of predicted, diffusion capacity 61% of predicted.  At her last visit with me, her FEV1 was also 2.44, the FVC was 2.9 and diffusion capacity 65% predicted.  Overall, there has been just a slight downward trend in the PFTs, which has not been significant in terms of greater than 10% decline in the FVC.  Of note, she did have a CT scan done in April of this year which did not show clearcut evidence of progression of her  disease.    ASSESSMENT AND PLAN:  In summary, then, Mrs. Antonio is a 66-year-old woman with interstitial lung disease.  The etiology is not clear.  Right now, the differential is whether this is related to prior chemotherapy and/or radiation versus IPF.  Currently, the patient remains asymptomatic.  Her O2 sats remain okay.  Her exam is unchanged with just minimal crackles.  A CT scan done 6 months ago showed stable fibrotic changes, and there has just been a very slight decrease in the pulmonary function tests.  It is unclear whether or not her ILD is indeed progressive at this point in time, and given that the patient continues to feel well, I will hold on considering an antifibrotic.  She was previously treated with Ofev by a previous physician, and that was poorly tolerated.  My plan will be to see the patient back in 6 months with repeat PFTs and chest CT scan at that time.          Aaron Snow MD  Pulmonary/Critical Care  October 27, 2021 12:04 PM                  Again, thank you for allowing me to participate in the care of your patient.        Sincerely,        Aaron Snow MD

## 2021-10-27 NOTE — NURSING NOTE
Chief Complaint   Patient presents with     Follow Up     6 month ILD      Vitals were taken and medications were reconciled.     Socorro Pinto RMA  10:11 AM

## 2021-12-21 ENCOUNTER — MYC MEDICAL ADVICE (OUTPATIENT)
Dept: OTHER | Age: 66
End: 2021-12-21

## 2021-12-22 DIAGNOSIS — J84.9 ILD (INTERSTITIAL LUNG DISEASE) (H): Primary | ICD-10-CM

## 2021-12-22 RX ORDER — LEVOFLOXACIN 500 MG/1
500 TABLET, FILM COATED ORAL DAILY
Qty: 10 TABLET | Refills: 0 | Status: SHIPPED | OUTPATIENT
Start: 2021-12-22 | End: 2022-03-01

## 2022-03-01 ENCOUNTER — HOSPITAL ENCOUNTER (EMERGENCY)
Facility: CLINIC | Age: 67
Discharge: HOME | End: 2022-03-01
Attending: EMERGENCY MEDICINE | Admitting: EMERGENCY MEDICINE
Payer: COMMERCIAL

## 2022-03-01 ENCOUNTER — APPOINTMENT (OUTPATIENT)
Dept: GENERAL RADIOLOGY | Facility: CLINIC | Age: 67
End: 2022-03-01
Attending: EMERGENCY MEDICINE
Payer: COMMERCIAL

## 2022-03-01 VITALS
OXYGEN SATURATION: 92 % | HEART RATE: 80 BPM | SYSTOLIC BLOOD PRESSURE: 98 MMHG | DIASTOLIC BLOOD PRESSURE: 54 MMHG | RESPIRATION RATE: 20 BRPM | WEIGHT: 168 LBS | TEMPERATURE: 97.6 F | BODY MASS INDEX: 24.81 KG/M2

## 2022-03-01 DIAGNOSIS — R05.3 CHRONIC COUGH: ICD-10-CM

## 2022-03-01 DIAGNOSIS — J84.9 ILD (INTERSTITIAL LUNG DISEASE) (H): ICD-10-CM

## 2022-03-01 LAB
ANION GAP SERPL CALCULATED.3IONS-SCNC: 13 MMOL/L (ref 3–14)
BASOPHILS # BLD AUTO: 0.1 10E3/UL (ref 0–0.2)
BASOPHILS NFR BLD AUTO: 1 %
BUN SERPL-MCNC: 13 MG/DL (ref 7–30)
CALCIUM SERPL-MCNC: 10.1 MG/DL (ref 8.5–10.1)
CHLORIDE BLD-SCNC: 105 MMOL/L (ref 94–109)
CO2 SERPL-SCNC: 21 MMOL/L (ref 20–32)
CREAT SERPL-MCNC: 0.69 MG/DL (ref 0.52–1.04)
EOSINOPHIL # BLD AUTO: 0.5 10E3/UL (ref 0–0.7)
EOSINOPHIL NFR BLD AUTO: 5 %
ERYTHROCYTE [DISTWIDTH] IN BLOOD BY AUTOMATED COUNT: 14 % (ref 10–15)
FLUAV RNA SPEC QL NAA+PROBE: NEGATIVE
FLUBV RNA RESP QL NAA+PROBE: NEGATIVE
GFR SERPL CREATININE-BSD FRML MDRD: >90 ML/MIN/1.73M2
GLUCOSE BLD-MCNC: 101 MG/DL (ref 70–99)
HCT VFR BLD AUTO: 48.1 % (ref 35–47)
HGB BLD-MCNC: 15.5 G/DL (ref 11.7–15.7)
HOLD SPECIMEN: NORMAL
IMM GRANULOCYTES # BLD: 0 10E3/UL
IMM GRANULOCYTES NFR BLD: 0 %
LYMPHOCYTES # BLD AUTO: 3.1 10E3/UL (ref 0.8–5.3)
LYMPHOCYTES NFR BLD AUTO: 27 %
MCH RBC QN AUTO: 29.8 PG (ref 26.5–33)
MCHC RBC AUTO-ENTMCNC: 32.2 G/DL (ref 31.5–36.5)
MCV RBC AUTO: 93 FL (ref 78–100)
MONOCYTES # BLD AUTO: 0.9 10E3/UL (ref 0–1.3)
MONOCYTES NFR BLD AUTO: 8 %
NEUTROPHILS # BLD AUTO: 6.9 10E3/UL (ref 1.6–8.3)
NEUTROPHILS NFR BLD AUTO: 59 %
NRBC # BLD AUTO: 0 10E3/UL
NRBC BLD AUTO-RTO: 0 /100
PLATELET # BLD AUTO: 298 10E3/UL (ref 150–450)
POTASSIUM BLD-SCNC: 3.6 MMOL/L (ref 3.4–5.3)
RBC # BLD AUTO: 5.2 10E6/UL (ref 3.8–5.2)
SARS-COV-2 RNA RESP QL NAA+PROBE: NEGATIVE
SODIUM SERPL-SCNC: 139 MMOL/L (ref 133–144)
WBC # BLD AUTO: 11.4 10E3/UL (ref 4–11)

## 2022-03-01 PROCEDURE — 87636 SARSCOV2 & INF A&B AMP PRB: CPT | Performed by: EMERGENCY MEDICINE

## 2022-03-01 PROCEDURE — 80048 BASIC METABOLIC PNL TOTAL CA: CPT | Performed by: EMERGENCY MEDICINE

## 2022-03-01 PROCEDURE — 85025 COMPLETE CBC W/AUTO DIFF WBC: CPT | Performed by: EMERGENCY MEDICINE

## 2022-03-01 PROCEDURE — 36415 COLL VENOUS BLD VENIPUNCTURE: CPT | Performed by: EMERGENCY MEDICINE

## 2022-03-01 PROCEDURE — 99284 EMERGENCY DEPT VISIT MOD MDM: CPT | Mod: 25

## 2022-03-01 PROCEDURE — 71046 X-RAY EXAM CHEST 2 VIEWS: CPT

## 2022-03-01 PROCEDURE — C9803 HOPD COVID-19 SPEC COLLECT: HCPCS

## 2022-03-02 ASSESSMENT — ENCOUNTER SYMPTOMS
FEVER: 0
COUGH: 1

## 2022-03-02 NOTE — ED PROVIDER NOTES
History     Chief Complaint:  Cough     The history is provided by the patient.      Windy Antonio is a 67 year old female with history of interstitial lung disease who presents with a cough that she has experienced for the last year and is experiencing exacerbation today. She used lozenges with no relief. She uses robitussin normally for coughs as well. She is taking losartan and amlodipine for BP. She has taken multiple COVID tests at home with negative results. No use of steroids.  Denies fever. She follows with Pulmonology.     Review of Systems   Constitutional: Negative for fever.   Respiratory: Positive for cough.    All other systems reviewed and are negative.    Allergies:  Nintedanib  Amoxicillin  Doxycycline  Erythromycin    Medications:  Caduet  Lexapro  Cozzar  Allegra  Mucinex    Past Medical History:     Anxiety  Hypertension  Breast cancer  Interstitial lung disease  Parathyroid adenoma    TMJ syndrome  Insomnia  Hyperparathyroidism    Past Surgical History:    Tonsil and adenoidectomy  Lymph nose biopsy  Salpingo-oophorectomy  Ovary removal  Parathyroid gland surgery  Breast biopsy  Breast lumpectomy  Right distal radius surgery     Family History:    Father - heart disease, hypertension  Mother - lung cancer  Brother - lung cancer, heart disease, substance abuse    Social History:  The patient presents to the ED alone.    Physical Exam     Patient Vitals for the past 24 hrs:   BP Temp Temp src Pulse Resp SpO2 Weight   03/01/22 2332 98/54 -- -- 80 20 92 % --   03/01/22 2130 -- -- -- -- -- -- 76.2 kg (168 lb)   03/01/22 2128 123/96 97.6  F (36.4  C) Temporal 106 22 94 % --     Physical Exam  Nursing note and vitals reviewed.  Constitutional: Cooperative. Sitting up comfortably in a chair.  HENT:   Mouth/Throat: Mucous membranes are normal.   Cardiovascular: Normal rate, regular rhythm and normal heart sounds.  No murmur.  Pulmonary/Chest: Effort normal and breath sounds normal. No respiratory  distress. No wheezes. No rales. No coughing.   Abdominal: Soft. Normal appearance. There is no tenderness.   Neurological: Alert. Oriented x4  Skin: Skin is warm and dry.  Psychiatric: Normal mood and affect.     Emergency Department Course     Imaging:  Chest XR,  PA & LAT   Final Result   IMPRESSION:   1. Moderately prominent irregular interstitial opacities throughout both lungs. Similar opacities were present on 10/30/2017, but they are mildly to moderately more prominent on this study. These most likely relate to scarring/fibrosis.   2. No other convincing evidence of active cardiopulmonary disease.        Report per radiology    Laboratory:  Labs Ordered and Resulted from Time of ED Arrival to Time of ED Departure   BASIC METABOLIC PANEL - Abnormal       Result Value    Sodium 139      Potassium 3.6      Chloride 105      Carbon Dioxide (CO2) 21      Anion Gap 13      Urea Nitrogen 13      Creatinine 0.69      Calcium 10.1      Glucose 101 (*)     GFR Estimate >90     CBC WITH PLATELETS AND DIFFERENTIAL - Abnormal    WBC Count 11.4 (*)     RBC Count 5.20      Hemoglobin 15.5      Hematocrit 48.1 (*)     MCV 93      MCH 29.8      MCHC 32.2      RDW 14.0      Platelet Count 298      % Neutrophils 59      % Lymphocytes 27      % Monocytes 8      % Eosinophils 5      % Basophils 1      % Immature Granulocytes 0      NRBCs per 100 WBC 0      Absolute Neutrophils 6.9      Absolute Lymphocytes 3.1      Absolute Monocytes 0.9      Absolute Eosinophils 0.5      Absolute Basophils 0.1      Absolute Immature Granulocytes 0.0      Absolute NRBCs 0.0     INFLUENZA A/B & SARS-COV2 PCR MULTIPLEX - Normal    Influenza A PCR Negative      Influenza B PCR Negative      SARS CoV2 PCR Negative        Reviewed:  I reviewed nursing notes, vitals, past medical history and Care Everywhere    Assessments:  2331 I obtained history and examined the patient as noted above.     Disposition:  The patient was discharged to home.      Impression & Plan     Medical Decision Making:  Windy Antonio is a 67 year old female with interstitial lung disease nos who presents with chronic cough for the past 2 months. She has exacerbation today. No fever. COVID and influenza swabs are negative. Basic labs are reassuring. Vitals are normal. She is not working to breathe. This does not appear to represent an infectious etiology Chest xray shows what appears to be chronic scarring likely related to her underlying pulmonary disease process. She has good follow up with her pulmonologist, I suspect an inflammatory component and wonder if steroids, either inhaled or systemic, would be beneficial to her. She is not on an ACE inhibitor. Symptoms are not consistent with GERD. I feel she is stable for discharge, plan for close follow up with pulmonology going forward.     Diagnosis:    ICD-10-CM    1. Chronic cough  R05.3    2. ILD (interstitial lung disease) (H)  J84.9      Scribe Disclosure:  I, Nataly Chavez, am serving as a scribe at 11:48 PM on 3/1/2022 to document services personally performed by Carmelo Foster MD based on my observations and the provider's statements to me.          aCrmelo Foster MD  03/02/22 0049

## 2022-03-02 NOTE — ED TRIAGE NOTES
Here for concern of congestion and coughing ongoing for 2 months. History of interstitial lung disease. Denies any sob. Sudafed and mucinex.  ABCs intact.

## 2022-04-09 ENCOUNTER — HEALTH MAINTENANCE LETTER (OUTPATIENT)
Age: 67
End: 2022-04-09

## 2022-06-22 ENCOUNTER — OFFICE VISIT (OUTPATIENT)
Dept: PULMONOLOGY | Facility: CLINIC | Age: 67
End: 2022-06-22
Attending: INTERNAL MEDICINE
Payer: COMMERCIAL

## 2022-06-22 ENCOUNTER — LAB (OUTPATIENT)
Dept: LAB | Facility: CLINIC | Age: 67
End: 2022-06-22
Attending: INTERNAL MEDICINE
Payer: COMMERCIAL

## 2022-06-22 ENCOUNTER — ANCILLARY PROCEDURE (OUTPATIENT)
Dept: CT IMAGING | Facility: CLINIC | Age: 67
End: 2022-06-22
Attending: INTERNAL MEDICINE
Payer: COMMERCIAL

## 2022-06-22 VITALS
OXYGEN SATURATION: 95 % | SYSTOLIC BLOOD PRESSURE: 128 MMHG | DIASTOLIC BLOOD PRESSURE: 85 MMHG | WEIGHT: 167 LBS | BODY MASS INDEX: 24.73 KG/M2 | HEART RATE: 81 BPM | HEIGHT: 69 IN

## 2022-06-22 DIAGNOSIS — J84.9 ILD (INTERSTITIAL LUNG DISEASE) (H): ICD-10-CM

## 2022-06-22 DIAGNOSIS — J84.9 ILD (INTERSTITIAL LUNG DISEASE) (H): Primary | ICD-10-CM

## 2022-06-22 LAB
ALBUMIN SERPL-MCNC: 4.3 G/DL (ref 3.4–5)
ALP SERPL-CCNC: 79 U/L (ref 40–150)
ALT SERPL W P-5'-P-CCNC: 28 U/L (ref 0–50)
ANION GAP SERPL CALCULATED.3IONS-SCNC: 5 MMOL/L (ref 3–14)
AST SERPL W P-5'-P-CCNC: 18 U/L (ref 0–45)
BASOPHILS # BLD AUTO: 0.1 10E3/UL (ref 0–0.2)
BASOPHILS NFR BLD AUTO: 1 %
BILIRUB DIRECT SERPL-MCNC: 0.1 MG/DL (ref 0–0.2)
BILIRUB SERPL-MCNC: 0.6 MG/DL (ref 0.2–1.3)
BUN SERPL-MCNC: 12 MG/DL (ref 7–30)
CALCIUM SERPL-MCNC: 10.2 MG/DL (ref 8.5–10.1)
CHLORIDE BLD-SCNC: 107 MMOL/L (ref 94–109)
CO2 SERPL-SCNC: 28 MMOL/L (ref 20–32)
CREAT SERPL-MCNC: 0.61 MG/DL (ref 0.52–1.04)
EOSINOPHIL # BLD AUTO: 0.4 10E3/UL (ref 0–0.7)
EOSINOPHIL NFR BLD AUTO: 6 %
ERYTHROCYTE [DISTWIDTH] IN BLOOD BY AUTOMATED COUNT: 13.4 % (ref 10–15)
GFR SERPL CREATININE-BSD FRML MDRD: >90 ML/MIN/1.73M2
GLUCOSE BLD-MCNC: 78 MG/DL (ref 70–99)
HCT VFR BLD AUTO: 45.8 % (ref 35–47)
HGB BLD-MCNC: 15.1 G/DL (ref 11.7–15.7)
IMM GRANULOCYTES # BLD: 0 10E3/UL
IMM GRANULOCYTES NFR BLD: 0 %
LYMPHOCYTES # BLD AUTO: 1.8 10E3/UL (ref 0.8–5.3)
LYMPHOCYTES NFR BLD AUTO: 25 %
MCH RBC QN AUTO: 30.8 PG (ref 26.5–33)
MCHC RBC AUTO-ENTMCNC: 33 G/DL (ref 31.5–36.5)
MCV RBC AUTO: 93 FL (ref 78–100)
MONOCYTES # BLD AUTO: 0.5 10E3/UL (ref 0–1.3)
MONOCYTES NFR BLD AUTO: 7 %
NEUTROPHILS # BLD AUTO: 4.5 10E3/UL (ref 1.6–8.3)
NEUTROPHILS NFR BLD AUTO: 61 %
NRBC # BLD AUTO: 0 10E3/UL
NRBC BLD AUTO-RTO: 0 /100
PLATELET # BLD AUTO: 255 10E3/UL (ref 150–450)
POTASSIUM BLD-SCNC: 4.3 MMOL/L (ref 3.4–5.3)
PROT SERPL-MCNC: 8.6 G/DL (ref 6.8–8.8)
RBC # BLD AUTO: 4.91 10E6/UL (ref 3.8–5.2)
SODIUM SERPL-SCNC: 140 MMOL/L (ref 133–144)
WBC # BLD AUTO: 7.4 10E3/UL (ref 4–11)

## 2022-06-22 PROCEDURE — 94375 RESPIRATORY FLOW VOLUME LOOP: CPT | Performed by: INTERNAL MEDICINE

## 2022-06-22 PROCEDURE — 82248 BILIRUBIN DIRECT: CPT | Performed by: PATHOLOGY

## 2022-06-22 PROCEDURE — G0463 HOSPITAL OUTPT CLINIC VISIT: HCPCS | Mod: 25

## 2022-06-22 PROCEDURE — 94729 DIFFUSING CAPACITY: CPT | Performed by: INTERNAL MEDICINE

## 2022-06-22 PROCEDURE — 71250 CT THORAX DX C-: CPT | Performed by: RADIOLOGY

## 2022-06-22 PROCEDURE — 85025 COMPLETE CBC W/AUTO DIFF WBC: CPT | Performed by: PATHOLOGY

## 2022-06-22 PROCEDURE — 36415 COLL VENOUS BLD VENIPUNCTURE: CPT | Performed by: PATHOLOGY

## 2022-06-22 PROCEDURE — 80053 COMPREHEN METABOLIC PANEL: CPT | Performed by: PATHOLOGY

## 2022-06-22 PROCEDURE — 99214 OFFICE O/P EST MOD 30 MIN: CPT | Mod: 25 | Performed by: INTERNAL MEDICINE

## 2022-06-22 ASSESSMENT — PAIN SCALES - GENERAL: PAINLEVEL: NO PAIN (0)

## 2022-06-22 NOTE — LETTER
6/22/2022         RE: Windy Antonio  [address] [address]  [place] [facility] [place] [address]        Dear Colleague,    Thank you for referring your patient, Windy Antonio, to the [facility] FOR LUNG SCIENCE AND [facility]. Please see a copy of my visit note below.    HISTORY OF PRESENT ILLNESS: Windy is a 67-year-old female with interstitial lung disease who underwent a VATS biopsy in 2017, which was not definitive for IPF.  The CT scan showed some changes that were suggestive of IPF.  She was seen by an outside pulmonologist who started her on Ofev for the possibility of IPF.  It was poorly tolerated.  Of note, the patient had a history of breast cancer for which she received Cytoxan in 2010 and radiation therapy.  There was concern that some of the changes could be related to chemotherapy and/or radiation versus IPF.  I have been following Ms. Antonio for changes in symptoms and following the PFTs.  She is currently not on therapy.  Her last visit with me was in 10/2021.  In terms of her breathing, she does not note a change in breathing and denies dyspnea on exertion.  She did have a respiratory tract infection around December with sinus congestion.  It settled down into her lungs and she had worsening cough.  She was treated with several courses of antibiotic with subsequent improvement.  She tested for COVID and tested negative.  Again, she returns today feeling unchanged compared to 10/2021.  She states that she feels fine.    PHYSICAL EXAMINATION:    VITAL SIGNS:  Stable.  Her O2 sats are 95% on room air, similar to what they were in 10/2021.  HEENT:  Eyes nonicteric.  Mouth and throat without lesions.  NECK:  Supple.  No thyromegaly.  LYMPH:  No appreciable adenopathy.  CHEST:  Faint crackles in the bases, otherwise clear, which is unchanged compared to prior exams.  HEART:  Regular rate and rhythm.  Normal S1, S2.  ABDOMEN:  No hepatosplenomegaly, nontender.  EXTREMITIES:   Without clubbing, cyanosis or edema.  JOINTS:  No evidence of active arthritis.  SKIN:  No rash.    PFTs:  FEV1 2.4 (88% of predicted), FVC 2.78 (78% of predicted) and diffusion capacity 62% of predicted.  On her last visit with me in 10/2021, the FVC was 2.85.  The time before that it was 2.9 and time before that it was 2.97, so there has been a sort of gradual fall off in the forced vital capacity.      I did do a CT scan on her today.  The CT scan was read by the radiologist as showing unchanged fibrotic pattern without evidence of progression.  However, there were several intrapulmonary lymph nodes, 1 of which may have slightly increased in size, with a recommendation that she get a repeat CT scan in 3 months.    ASSESSMENT AND PLAN:    1.  In summary, then, Ms. Antonio is a 67-year-old woman with interstitial lung disease, the etiology is not clear.  The differential is whether this is related to prior chemotherapy and/or radiation versus a progressive fibrotic disease, such as IPF.  Currently, the patient remains asymptomatic.  Her exam is unchanged with crackles in the bases.  Her O2 sats are 95%, which is similar to what they were and a CT scan from today which did not show significant progression in the fibrosis.  However, of some concern there has been a trend for a decrease in her forced vital capacity, which is difficult to explain.  It is unclear whether this could be related to some progression of the fibrosis that is not apparent of the CT.  I will have her CT scan reviewed at our interstitial lung disease conference to help determine whether there is any progression in her ILD.  For now, I will not institute any treatment until I have had a chance to review the CT scan in our ILD conference.  Currently, the patient is asymptomatic and if the CT scan is stable, the plan would be to continue to follow her along.    2.  Possibly increase in size of intrapulmonary lymph node with a recommendation of a  repeat CT scan in 3 months.  She will be scheduled for followup in clinic with repeat CT scan in 3 months.      Aaron Snow MD  Pulmonary/Critical Care  June 22, 2022 2:21 PM

## 2022-06-22 NOTE — NURSING NOTE
Chief Complaint   Patient presents with     Interstitial Lung Disease (ILD)     Follow up ILD        Vitals were taken and medications were reconciled.     Socorro HUAA  10:29 AM

## 2022-06-22 NOTE — PROGRESS NOTES
HISTORY OF PRESENT ILLNESS: Windy is a 67-year-old female with interstitial lung disease who underwent a VATS biopsy in 2017, which was not definitive for IPF.  The CT scan showed some changes that were suggestive of IPF.  She was seen by an outside pulmonologist who started her on Ofev for the possibility of IPF.  It was poorly tolerated.  Of note, the patient had a history of breast cancer for which she received Cytoxan in 2010 and radiation therapy.  There was concern that some of the changes could be related to chemotherapy and/or radiation versus IPF.  I have been following Ms. Antonio for changes in symptoms and following the PFTs.  She is currently not on therapy.  Her last visit with me was in 10/2021.  In terms of her breathing, she does not note a change in breathing and denies dyspnea on exertion.  She did have a respiratory tract infection around December with sinus congestion.  It settled down into her lungs and she had worsening cough.  She was treated with several courses of antibiotic with subsequent improvement.  She tested for COVID and tested negative.  Again, she returns today feeling unchanged compared to 10/2021.  She states that she feels fine.    PHYSICAL EXAMINATION:    VITAL SIGNS:  Stable.  Her O2 sats are 95% on room air, similar to what they were in 10/2021.  HEENT:  Eyes nonicteric.  Mouth and throat without lesions.  NECK:  Supple.  No thyromegaly.  LYMPH:  No appreciable adenopathy.  CHEST:  Faint crackles in the bases, otherwise clear, which is unchanged compared to prior exams.  HEART:  Regular rate and rhythm.  Normal S1, S2.  ABDOMEN:  No hepatosplenomegaly, nontender.  EXTREMITIES:  Without clubbing, cyanosis or edema.  JOINTS:  No evidence of active arthritis.  SKIN:  No rash.    PFTs:  FEV1 2.4 (88% of predicted), FVC 2.78 (78% of predicted) and diffusion capacity 62% of predicted.  On her last visit with me in 10/2021, the FVC was 2.85.  The time before that it was 2.9 and  time before that it was 2.97, so there has been a sort of gradual fall off in the forced vital capacity.      I did do a CT scan on her today.  The CT scan was read by the radiologist as showing unchanged fibrotic pattern without evidence of progression.  However, there were several intrapulmonary lymph nodes, 1 of which may have slightly increased in size, with a recommendation that she get a repeat CT scan in 3 months.    ASSESSMENT AND PLAN:    1.  In summary, then, Ms. Antonio is a 67-year-old woman with interstitial lung disease, the etiology is not clear.  The differential is whether this is related to prior chemotherapy and/or radiation versus a progressive fibrotic disease, such as IPF.  Currently, the patient remains asymptomatic.  Her exam is unchanged with crackles in the bases.  Her O2 sats are 95%, which is similar to what they were and a CT scan from today which did not show significant progression in the fibrosis.  However, of some concern there has been a trend for a decrease in her forced vital capacity, which is difficult to explain.  It is unclear whether this could be related to some progression of the fibrosis that is not apparent of the CT.  I will have her CT scan reviewed at our interstitial lung disease conference to help determine whether there is any progression in her ILD.  For now, I will not institute any treatment until I have had a chance to review the CT scan in our ILD conference.  Currently, the patient is asymptomatic and if the CT scan is stable, the plan would be to continue to follow her along.    2.  Possibly increase in size of intrapulmonary lymph node with a recommendation of a repeat CT scan in 3 months.  She will be scheduled for followup in clinic with repeat CT scan in 3 months.      Aaron Snow MD  Pulmonary/Critical Care  June 22, 2022 2:21 PM      Addendum: Case reviewed in ILD conference. CT scan fibrotic changes felt to be stable. Will continue to follow.  P{lola for repeat chest CT in 3 months to monitor intrapulmonary lymph node given history of breast cancer.    Aaron Snow MD    Answers for HPI/ROS submitted by the patient on 6/19/2022  General Symptoms: No  Skin Symptoms: No  HENT Symptoms: No  EYE SYMPTOMS: No  HEART SYMPTOMS: No  LUNG SYMPTOMS: No  INTESTINAL SYMPTOMS: No  URINARY SYMPTOMS: No  GYNECOLOGIC SYMPTOMS: No  BREAST SYMPTOMS: No  SKELETAL SYMPTOMS: No  BLOOD SYMPTOMS: No  NERVOUS SYSTEM SYMPTOMS: No  MENTAL HEALTH SYMPTOMS: No

## 2022-06-23 LAB
DLCOUNC-%PRED-PRE: 62 %
DLCOUNC-PRE: 14.5 ML/MIN/MMHG
DLCOUNC-PRED: 23.26 ML/MIN/MMHG
ERV-%PRED-PRE: 110 %
ERV-PRE: 1.04 L
ERV-PRED: 0.94 L
EXPTIME-PRE: 5.81 SEC
FEF2575-%PRED-PRE: 129 %
FEF2575-PRE: 2.92 L/SEC
FEF2575-PRED: 2.25 L/SEC
FEFMAX-%PRED-PRE: 114 %
FEFMAX-PRE: 7.7 L/SEC
FEFMAX-PRED: 6.73 L/SEC
FEV1-%PRED-PRE: 88 %
FEV1-PRE: 2.4 L
FEV1FEV6-PRE: 86 %
FEV1FEV6-PRED: 80 %
FEV1FVC-PRE: 86 %
FEV1FVC-PRED: 78 %
FEV1SVC-PRE: 87 %
FEV1SVC-PRED: 72 %
FIFMAX-PRE: 5.44 L/SEC
FVC-%PRED-PRE: 78 %
FVC-PRE: 2.78 L
FVC-PRED: 3.53 L
IC-%PRED-PRE: 60 %
IC-PRE: 1.73 L
IC-PRED: 2.84 L
VA-%PRED-PRE: 64 %
VA-PRE: 3.76 L
VC-%PRED-PRE: 73 %
VC-PRE: 2.77 L
VC-PRED: 3.78 L

## 2022-06-27 ENCOUNTER — PATIENT OUTREACH (OUTPATIENT)
Dept: PULMONOLOGY | Facility: CLINIC | Age: 67
End: 2022-06-27

## 2022-06-27 NOTE — PROGRESS NOTES
Dr Snow reviewed CT scan and Case in ILD conference. CT scan fibrotic changes felt to be stable. Will continue to follow. Plan for repeat chest CT in 3 months to monitor intrapulmonary lymph node given history of breast cancer.  Spoke to patient, who said she already spoke to Dr Snow.

## 2022-09-29 ENCOUNTER — OFFICE VISIT (OUTPATIENT)
Dept: PULMONOLOGY | Facility: CLINIC | Age: 67
End: 2022-09-29
Attending: PHYSICIAN ASSISTANT
Payer: COMMERCIAL

## 2022-09-29 ENCOUNTER — ANCILLARY PROCEDURE (OUTPATIENT)
Dept: CT IMAGING | Facility: CLINIC | Age: 67
End: 2022-09-29
Attending: INTERNAL MEDICINE
Payer: COMMERCIAL

## 2022-09-29 ENCOUNTER — LAB (OUTPATIENT)
Dept: LAB | Facility: CLINIC | Age: 67
End: 2022-09-29
Attending: PHYSICIAN ASSISTANT
Payer: COMMERCIAL

## 2022-09-29 VITALS
WEIGHT: 171 LBS | BODY MASS INDEX: 25.33 KG/M2 | DIASTOLIC BLOOD PRESSURE: 89 MMHG | HEIGHT: 69 IN | SYSTOLIC BLOOD PRESSURE: 138 MMHG | OXYGEN SATURATION: 96 % | HEART RATE: 76 BPM

## 2022-09-29 DIAGNOSIS — J84.9 ILD (INTERSTITIAL LUNG DISEASE) (H): ICD-10-CM

## 2022-09-29 DIAGNOSIS — J84.9 ILD (INTERSTITIAL LUNG DISEASE) (H): Primary | ICD-10-CM

## 2022-09-29 LAB
ALBUMIN SERPL BCG-MCNC: 4.4 G/DL (ref 3.5–5.2)
ALP SERPL-CCNC: 83 U/L (ref 35–104)
ALT SERPL W P-5'-P-CCNC: 21 U/L (ref 10–35)
ANION GAP SERPL CALCULATED.3IONS-SCNC: 10 MMOL/L (ref 7–15)
AST SERPL W P-5'-P-CCNC: 23 U/L (ref 10–35)
BASOPHILS # BLD AUTO: 0.1 10E3/UL (ref 0–0.2)
BASOPHILS NFR BLD AUTO: 1 %
BILIRUB DIRECT SERPL-MCNC: <0.2 MG/DL (ref 0–0.3)
BILIRUB SERPL-MCNC: 0.5 MG/DL
BUN SERPL-MCNC: 15.2 MG/DL (ref 8–23)
CALCIUM SERPL-MCNC: 10.3 MG/DL (ref 8.8–10.2)
CHLORIDE SERPL-SCNC: 103 MMOL/L (ref 98–107)
CREAT SERPL-MCNC: 0.61 MG/DL (ref 0.51–0.95)
CRP SERPL-MCNC: 15.9 MG/L
DEPRECATED HCO3 PLAS-SCNC: 26 MMOL/L (ref 22–29)
EOSINOPHIL # BLD AUTO: 0.5 10E3/UL (ref 0–0.7)
EOSINOPHIL NFR BLD AUTO: 6 %
ERYTHROCYTE [DISTWIDTH] IN BLOOD BY AUTOMATED COUNT: 13.2 % (ref 10–15)
GFR SERPL CREATININE-BSD FRML MDRD: >90 ML/MIN/1.73M2
GLUCOSE SERPL-MCNC: 98 MG/DL (ref 70–99)
HCT VFR BLD AUTO: 45.3 % (ref 35–47)
HGB BLD-MCNC: 14.8 G/DL (ref 11.7–15.7)
IMM GRANULOCYTES # BLD: 0 10E3/UL
IMM GRANULOCYTES NFR BLD: 0 %
LYMPHOCYTES # BLD AUTO: 2 10E3/UL (ref 0.8–5.3)
LYMPHOCYTES NFR BLD AUTO: 25 %
MCH RBC QN AUTO: 31.3 PG (ref 26.5–33)
MCHC RBC AUTO-ENTMCNC: 32.7 G/DL (ref 31.5–36.5)
MCV RBC AUTO: 96 FL (ref 78–100)
MONOCYTES # BLD AUTO: 0.8 10E3/UL (ref 0–1.3)
MONOCYTES NFR BLD AUTO: 10 %
NEUTROPHILS # BLD AUTO: 4.4 10E3/UL (ref 1.6–8.3)
NEUTROPHILS NFR BLD AUTO: 58 %
NRBC # BLD AUTO: 0 10E3/UL
NRBC BLD AUTO-RTO: 0 /100
PLATELET # BLD AUTO: 248 10E3/UL (ref 150–450)
POTASSIUM SERPL-SCNC: 4.8 MMOL/L (ref 3.4–5.3)
PROT SERPL-MCNC: 7.7 G/DL (ref 6.4–8.3)
RBC # BLD AUTO: 4.73 10E6/UL (ref 3.8–5.2)
SODIUM SERPL-SCNC: 139 MMOL/L (ref 136–145)
WBC # BLD AUTO: 7.8 10E3/UL (ref 4–11)

## 2022-09-29 PROCEDURE — 80053 COMPREHEN METABOLIC PANEL: CPT | Performed by: PATHOLOGY

## 2022-09-29 PROCEDURE — 99214 OFFICE O/P EST MOD 30 MIN: CPT | Mod: 25 | Performed by: INTERNAL MEDICINE

## 2022-09-29 PROCEDURE — 86140 C-REACTIVE PROTEIN: CPT | Performed by: PATHOLOGY

## 2022-09-29 PROCEDURE — 36415 COLL VENOUS BLD VENIPUNCTURE: CPT | Performed by: PATHOLOGY

## 2022-09-29 PROCEDURE — 85025 COMPLETE CBC W/AUTO DIFF WBC: CPT | Performed by: PATHOLOGY

## 2022-09-29 PROCEDURE — 94375 RESPIRATORY FLOW VOLUME LOOP: CPT | Performed by: INTERNAL MEDICINE

## 2022-09-29 PROCEDURE — G0463 HOSPITAL OUTPT CLINIC VISIT: HCPCS | Mod: 25

## 2022-09-29 PROCEDURE — 71250 CT THORAX DX C-: CPT | Performed by: RADIOLOGY

## 2022-09-29 PROCEDURE — 94729 DIFFUSING CAPACITY: CPT | Performed by: INTERNAL MEDICINE

## 2022-09-29 PROCEDURE — 82248 BILIRUBIN DIRECT: CPT | Performed by: PATHOLOGY

## 2022-09-29 NOTE — PROGRESS NOTES
Reason for Visit  Windy Antonio is a 67 year old year old female who is being seen for Interstitial Lung Disease (ILD) (ILD Follow up )    ILD HPI    Windy Antonio is a 67-year-old female who follows up today for interstitial lung disease.  She was previously followed by Dr. Aaron rao.  She has ILD of unclear etiology having had a VATS biopsy showing essentially nonspecific inflammation she was briefly treated by an outside pulmonologist with Nintedanib but she did not tolerate this.  She has been following with Dr. Rao not on any specific therapy for her ILD and she has been stable.  She does have a history of breast cancer in 2010 and did receive chemotherapy and there was some question about whether this is contributed to her ILD.  Also on her recent CT scan there were some fissural nodules that were felt to be likely intrapulmonary lymph nodes that were slightly enlarged and so she got a CT scan today to follow-up on these.  Overall she states that she feels well she denies shortness of breath dyspnea on exertion.  She does not really have any significant symptoms.  No new complaints today.      Current Outpatient Medications   Medication     amLODIPine-atorvastatin (CADUET) 10-10 MG tablet     escitalopram (LEXAPRO) 10 MG tablet     Fexofenadine HCl (ALLEGRA ODT PO)     guaiFENesin (MUCINEX) 600 MG 12 hr tablet     losartan (COZAAR) 50 MG tablet     No current facility-administered medications for this visit.     Allergies   Allergen Reactions     Nintedanib Cramps, Diarrhea, Fatigue, GI Disturbance, Nausea and Vomiting, Palpitations, Rash and Shortness Of Breath     Amoxicillin Hives     Doxycycline Nausea and Vomiting     Erythromycin Other (See Comments)     Past Medical History:   Diagnosis Date     Anxiety      Benign essential hypertension      Breast cancer      ILD (interstitial lung disease)      Parathyroid adenoma        No past surgical history on file.    Social History  "    Socioeconomic History     Marital status:      Spouse name: Not on file     Number of children: Not on file     Years of education: Not on file     Highest education level: Not on file   Occupational History     Not on file   Tobacco Use     Smoking status: Never Smoker     Smokeless tobacco: Never Used   Substance and Sexual Activity     Alcohol use: Yes     Comment: weekends     Drug use: No     Sexual activity: Not on file   Other Topics Concern     Parent/sibling w/ CABG, MI or angioplasty before 65F 55M? Not Asked   Social History Narrative     Not on file     Social Determinants of Health     Financial Resource Strain: Not on file   Food Insecurity: Not on file   Transportation Needs: Not on file   Physical Activity: Not on file   Stress: Not on file   Social Connections: Not on file   Intimate Partner Violence: Not on file   Housing Stability: Not on file       No family history on file.    ROS Pulmonary  Answers for HPI/ROS submitted by the patient on 9/22/2022  General Symptoms: No  Skin Symptoms: No  HENT Symptoms: No  EYE SYMPTOMS: No  HEART SYMPTOMS: No  LUNG SYMPTOMS: No  INTESTINAL SYMPTOMS: No  URINARY SYMPTOMS: No  GYNECOLOGIC SYMPTOMS: No  BREAST SYMPTOMS: No  SKELETAL SYMPTOMS: No  BLOOD SYMPTOMS: No  NERVOUS SYSTEM SYMPTOMS: No  MENTAL HEALTH SYMPTOMS: No      A complete ROS was otherwise negative except as noted in the HPI.  Vitals:    09/29/22 1257   BP: 138/89   Pulse: 76   SpO2: 96%   Weight: 77.6 kg (171 lb)   Height: 1.753 m (5' 9\")     Exam:   GENERAL APPEARANCE: Well developed, well nourished, alert, and in no apparent distress.  NECK: supple, no masses, no thyromegaly.  LYMPHATICS: No significant axillary, cervical, or supraclavicular nodes.  RESP: good air flow throughout, - minimal bibasilar crackles  CV: Normal S1, S2, regular rhythm, normal rate, no rub, no murmur,  no gallop, no LE edema.   ABDOMEN:  Bowel sounds normal, soft, nontender, no HSM or masses.   MS: extremities " normal- no clubbing, no cyanosis.  PSYCH: mentation appears normal. and affect normal/bright  Results: I have reviewed all imaging, PFTs and other relavent tests, please see below for details, PFT and imaging results were reviewed with the patient.  PFTs: Normal spirometry with mild reduction in diffusing capacity.  Overall stable from previous.  CT scan of chest (reviewed by me): No significant change in the bilateral interstitial reticular opacities.  There appears to be a slight decrease in the previously seen intrapulmonary lymph nodes.    Assessment and plan:    67-year-old female with stable interstitial lung disease of unclear etiology.  I will continue to see her every 6 months with pulmonary function tests.  I think given the reduced size of the nodules I do not think they need further follow-up.  I will discussed with the patient due to her history of breast cancer we may consider a repeat CT scan in 1 year.  Otherwise would not make any changes I am instructed to call sooner with any changes in her breathing and I will see her back in 6 months with pulmonary function test.    I spent 30 minutes on the date of the encounter doing chart review, history and exam, interpretation of any new PFTs and imaging, documentation and further activities as noted above.        CBC   Recent Labs   Lab Test 09/29/22  1119 06/22/22  0937   RBC 4.73 4.91   HGB 14.8 15.1   HCT 45.3 45.8    255       Basic Metabolic Panel  Recent Labs   Lab Test 09/29/22  1119 06/22/22  0937    140   POTASSIUM 4.8 4.3   CHLORIDE 103 107   CO2 26 28   BUN 15.2 12   GLC 98 78   JIN 10.3* 10.2*       INR  No results for input(s): INR in the last 40662 hours.    PFT  PFT Latest Ref Rng & Units 9/29/2022   FVC L 2.81   FEV1 L 2.44   FVC% % 79   FEV1% % 89           CC:

## 2022-09-29 NOTE — LETTER
9/29/2022         RE: Windy Antonio  [address] [address]  [place] [address]        Dear Colleague,    Thank you for referring your patient, Windy Antonio, to the [facility] AND [facility] [place]. Please see a copy of my visit note below.    Reason for Visit  Windy Antonio is a 67 year old year old female who is being seen for Interstitial Lung Disease (ILD) (ILD Follow up )    ILD HPI    Windy Antonio is a 67-year-old female who follows up today for interstitial lung disease.  She was previously followed by Dr. Aaron rao.  She has ILD of unclear etiology having had a VATS biopsy showing essentially nonspecific inflammation she was briefly treated by an outside pulmonologist with Nintedanib but she did not tolerate this.  She has been following with Dr. Rao not on any specific therapy for her ILD and she has been stable.  She does have a history of breast cancer in 2010 and did receive chemotherapy and there was some question about whether this is contributed to her ILD.  Also on her recent CT scan there were some fissural nodules that were felt to be likely intrapulmonary lymph nodes that were slightly enlarged and so she got a CT scan today to follow-up on these.  Overall she states that she feels well she denies shortness of breath dyspnea on exertion.  She does not really have any significant symptoms.  No new complaints today.      Current Outpatient Medications   Medication     amLODIPine-atorvastatin (CADUET) 10-10 MG tablet     escitalopram (LEXAPRO) 10 MG tablet     Fexofenadine HCl (ALLEGRA ODT PO)     guaiFENesin (MUCINEX) 600 MG 12 hr tablet     losartan (COZAAR) 50 MG tablet     No current facility-administered medications for this visit.     Allergies   Allergen Reactions     Nintedanib Cramps, Diarrhea, Fatigue, GI Disturbance, Nausea and Vomiting, Palpitations, Rash and Shortness Of Breath     Amoxicillin Hives     Doxycycline  "Nausea and Vomiting     Erythromycin Other (See Comments)     Past Medical History:   Diagnosis Date     Anxiety      Benign essential hypertension      Breast cancer      ILD (interstitial lung disease)      Parathyroid adenoma        No past surgical history on file.    Social History     Socioeconomic History     Marital status:      Spouse name: Not on file     Number of children: Not on file     Years of education: Not on file     Highest education level: Not on file   Occupational History     Not on file   Tobacco Use     Smoking status: Never Smoker     Smokeless tobacco: Never Used   Substance and Sexual Activity     Alcohol use: Yes     Comment: weekends     Drug use: No     Sexual activity: Not on file   Other Topics Concern     Parent/sibling w/ CABG, MI or angioplasty before 65F 55M? Not Asked   Social History Narrative     Not on file     Social Determinants of Health     Financial Resource Strain: Not on file   Food Insecurity: Not on file   Transportation Needs: Not on file   Physical Activity: Not on file   Stress: Not on file   Social Connections: Not on file   Intimate Partner Violence: Not on file   Housing Stability: Not on file       No family history on file.    ROS Pulmonary  Answers for HPI/ROS submitted by the patient on 9/22/2022  General Symptoms: No  Skin Symptoms: No  HENT Symptoms: No  EYE SYMPTOMS: No  HEART SYMPTOMS: No  LUNG SYMPTOMS: No  INTESTINAL SYMPTOMS: No  URINARY SYMPTOMS: No  GYNECOLOGIC SYMPTOMS: No  BREAST SYMPTOMS: No  SKELETAL SYMPTOMS: No  BLOOD SYMPTOMS: No  NERVOUS SYSTEM SYMPTOMS: No  MENTAL HEALTH SYMPTOMS: No      A complete ROS was otherwise negative except as noted in the HPI.  Vitals:    09/29/22 1257   BP: 138/89   Pulse: 76   SpO2: 96%   Weight: 77.6 kg (171 lb)   Height: 1.753 m (5' 9\")     Exam:   GENERAL APPEARANCE: Well developed, well nourished, alert, and in no apparent distress.  NECK: supple, no masses, no thyromegaly.  LYMPHATICS: No " significant axillary, cervical, or supraclavicular nodes.  RESP: good air flow throughout, - minimal bibasilar crackles  CV: Normal S1, S2, regular rhythm, normal rate, no rub, no murmur,  no gallop, no LE edema.   ABDOMEN:  Bowel sounds normal, soft, nontender, no HSM or masses.   MS: extremities normal- no clubbing, no cyanosis.  PSYCH: mentation appears normal. and affect normal/bright  Results: I have reviewed all imaging, PFTs and other relavent tests, please see below for details, PFT and imaging results were reviewed with the patient.  PFTs: Normal spirometry with mild reduction in diffusing capacity.  Overall stable from previous.  CT scan of chest (reviewed by me): No significant change in the bilateral interstitial reticular opacities.  There appears to be a slight decrease in the previously seen intrapulmonary lymph nodes.    Assessment and plan:    67-year-old female with stable interstitial lung disease of unclear etiology.  I will continue to see her every 6 months with pulmonary function tests.  I think given the reduced size of the nodules I do not think they need further follow-up.  I will discussed with the patient due to her history of breast cancer we may consider a repeat CT scan in 1 year.  Otherwise would not make any changes I am instructed to call sooner with any changes in her breathing and I will see her back in 6 months with pulmonary function test.    I spent 30 minutes on the date of the encounter doing chart review, history and exam, interpretation of any new PFTs and imaging, documentation and further activities as noted above.        CBC   Recent Labs   Lab Test 09/29/22  1119 06/22/22  0937   RBC 4.73 4.91   HGB 14.8 15.1   HCT 45.3 45.8    255       Basic Metabolic Panel  Recent Labs   Lab Test 09/29/22  1119 06/22/22  0937    140   POTASSIUM 4.8 4.3   CHLORIDE 103 107   CO2 26 28   BUN 15.2 12   GLC 98 78   JIN 10.3* 10.2*       INR  No results for input(s): INR in  the last 86345 hours.    PFT  PFT Latest Ref Rng & Units 9/29/2022   FVC L 2.81   FEV1 L 2.44   FVC% % 79   FEV1% % 89       Again, thank you for allowing me to participate in the care of your patient.        Sincerely,        David Morris Perlman, MD

## 2022-09-30 ENCOUNTER — TELEPHONE (OUTPATIENT)
Dept: PULMONOLOGY | Facility: CLINIC | Age: 67
End: 2022-09-30

## 2022-09-30 LAB
DLCOUNC-%PRED-PRE: 60 %
DLCOUNC-PRE: 14.16 ML/MIN/MMHG
DLCOUNC-PRED: 23.26 ML/MIN/MMHG
ERV-%PRED-PRE: 69 %
ERV-PRE: 0.63 L
ERV-PRED: 0.91 L
EXPTIME-PRE: 5.06 SEC
FEF2575-%PRED-PRE: 124 %
FEF2575-PRE: 2.8 L/SEC
FEF2575-PRED: 2.25 L/SEC
FEFMAX-%PRED-PRE: 109 %
FEFMAX-PRE: 7.38 L/SEC
FEFMAX-PRED: 6.73 L/SEC
FEV1-%PRED-PRE: 89 %
FEV1-PRE: 2.44 L
FEV1FEV6-PRE: 87 %
FEV1FEV6-PRED: 80 %
FEV1FVC-PRE: 87 %
FEV1FVC-PRED: 78 %
FEV1SVC-PRE: 88 %
FEV1SVC-PRED: 72 %
FIFMAX-PRE: 5.3 L/SEC
FVC-%PRED-PRE: 79 %
FVC-PRE: 2.81 L
FVC-PRED: 3.53 L
IC-%PRED-PRE: 74 %
IC-PRE: 2.15 L
IC-PRED: 2.87 L
VA-%PRED-PRE: 67 %
VA-PRE: 3.93 L
VC-%PRED-PRE: 73 %
VC-PRE: 2.78 L
VC-PRED: 3.78 L

## 2022-09-30 NOTE — TELEPHONE ENCOUNTER
Updated patient with CT results per Dr. Perlman request; nodules smaller.   Discussed CRP level (ordered by Dr. Snow); pt not c/o new or worsening symptoms, no action required for this elevated lab per Dr. Perlman. Patient appreciative of call.     Vanessa Perez, RN, BSN  ILD Nurse Care Coordinator  (P) [phone]

## 2022-10-10 ENCOUNTER — HEALTH MAINTENANCE LETTER (OUTPATIENT)
Age: 67
End: 2022-10-10

## 2023-02-18 ENCOUNTER — HEALTH MAINTENANCE LETTER (OUTPATIENT)
Age: 68
End: 2023-02-18

## 2024-01-06 ENCOUNTER — HEALTH MAINTENANCE LETTER (OUTPATIENT)
Age: 69
End: 2024-01-06

## 2024-03-16 ENCOUNTER — HEALTH MAINTENANCE LETTER (OUTPATIENT)
Age: 69
End: 2024-03-16

## 2025-01-25 ENCOUNTER — HEALTH MAINTENANCE LETTER (OUTPATIENT)
Age: 70
End: 2025-01-25

## 2025-03-22 ENCOUNTER — HEALTH MAINTENANCE LETTER (OUTPATIENT)
Age: 70
End: 2025-03-22